# Patient Record
Sex: MALE | Race: WHITE | NOT HISPANIC OR LATINO | Employment: FULL TIME | URBAN - METROPOLITAN AREA
[De-identification: names, ages, dates, MRNs, and addresses within clinical notes are randomized per-mention and may not be internally consistent; named-entity substitution may affect disease eponyms.]

---

## 2017-07-28 ENCOUNTER — TELEPHONE (OUTPATIENT)
Dept: FAMILY MEDICINE | Facility: CLINIC | Age: 62
End: 2017-07-28

## 2017-07-28 DIAGNOSIS — Z00.00 PREVENTATIVE HEALTH CARE: ICD-10-CM

## 2017-07-28 DIAGNOSIS — Z00.00 ROUTINE GENERAL MEDICAL EXAMINATION AT A HEALTH CARE FACILITY: Primary | ICD-10-CM

## 2017-07-28 NOTE — TELEPHONE ENCOUNTER
----- Message from Helen Tidwell sent at 7/28/2017  1:34 PM CDT -----  Contact: [phone]/self  Patient is requesting orders for blood work. Please advise.

## 2017-09-06 ENCOUNTER — TELEPHONE (OUTPATIENT)
Dept: FAMILY MEDICINE | Facility: CLINIC | Age: 62
End: 2017-09-06

## 2017-09-06 NOTE — TELEPHONE ENCOUNTER
----- Message from Sophie Thompson sent at 9/6/2017  1:28 PM CDT -----  Contact: [phone]  Pt its requesting to schedule lab work prior to his physical for next week . Please advise

## 2017-09-12 ENCOUNTER — LAB VISIT (OUTPATIENT)
Dept: LAB | Facility: HOSPITAL | Age: 62
End: 2017-09-12
Attending: FAMILY MEDICINE
Payer: COMMERCIAL

## 2017-09-12 DIAGNOSIS — Z00.00 PREVENTATIVE HEALTH CARE: ICD-10-CM

## 2017-09-12 DIAGNOSIS — Z00.00 ROUTINE GENERAL MEDICAL EXAMINATION AT A HEALTH CARE FACILITY: ICD-10-CM

## 2017-09-12 LAB
ALBUMIN SERPL BCP-MCNC: 3.9 G/DL
ALP SERPL-CCNC: 73 U/L
ALT SERPL W/O P-5'-P-CCNC: 17 U/L
ANION GAP SERPL CALC-SCNC: 7 MMOL/L
AST SERPL-CCNC: 18 U/L
BASOPHILS # BLD AUTO: 0.02 K/UL
BASOPHILS NFR BLD: 0.4 %
BILIRUB SERPL-MCNC: 1.3 MG/DL
BUN SERPL-MCNC: 17 MG/DL
CALCIUM SERPL-MCNC: 9.4 MG/DL
CHLORIDE SERPL-SCNC: 106 MMOL/L
CHOLEST SERPL-MCNC: 136 MG/DL
CHOLEST/HDLC SERPL: 3.3 {RATIO}
CO2 SERPL-SCNC: 29 MMOL/L
COMPLEXED PSA SERPL-MCNC: 0.82 NG/ML
CREAT SERPL-MCNC: 1.1 MG/DL
DIFFERENTIAL METHOD: NORMAL
EOSINOPHIL # BLD AUTO: 0.2 K/UL
EOSINOPHIL NFR BLD: 3.4 %
ERYTHROCYTE [DISTWIDTH] IN BLOOD BY AUTOMATED COUNT: 12.4 %
EST. GFR  (AFRICAN AMERICAN): >60 ML/MIN/1.73 M^2
EST. GFR  (NON AFRICAN AMERICAN): >60 ML/MIN/1.73 M^2
ESTIMATED AVG GLUCOSE: 105 MG/DL
GLUCOSE SERPL-MCNC: 109 MG/DL
HBA1C MFR BLD HPLC: 5.3 %
HCT VFR BLD AUTO: 47.8 %
HDLC SERPL-MCNC: 41 MG/DL
HDLC SERPL: 30.1 %
HGB BLD-MCNC: 15.9 G/DL
LDLC SERPL CALC-MCNC: 84.6 MG/DL
LYMPHOCYTES # BLD AUTO: 1 K/UL
LYMPHOCYTES NFR BLD: 21.6 %
MCH RBC QN AUTO: 30.2 PG
MCHC RBC AUTO-ENTMCNC: 33.3 G/DL
MCV RBC AUTO: 91 FL
MONOCYTES # BLD AUTO: 0.5 K/UL
MONOCYTES NFR BLD: 11.3 %
NEUTROPHILS # BLD AUTO: 2.9 K/UL
NEUTROPHILS NFR BLD: 62.9 %
NONHDLC SERPL-MCNC: 95 MG/DL
PLATELET # BLD AUTO: 157 K/UL
PMV BLD AUTO: 10 FL
POTASSIUM SERPL-SCNC: 3.9 MMOL/L
PROT SERPL-MCNC: 6.9 G/DL
RBC # BLD AUTO: 5.27 M/UL
SODIUM SERPL-SCNC: 142 MMOL/L
TRIGL SERPL-MCNC: 52 MG/DL
TSH SERPL DL<=0.005 MIU/L-ACNC: 1 UIU/ML
WBC # BLD AUTO: 4.68 K/UL

## 2017-09-12 PROCEDURE — 80053 COMPREHEN METABOLIC PANEL: CPT

## 2017-09-12 PROCEDURE — 83036 HEMOGLOBIN GLYCOSYLATED A1C: CPT

## 2017-09-12 PROCEDURE — 36415 COLL VENOUS BLD VENIPUNCTURE: CPT

## 2017-09-12 PROCEDURE — 84443 ASSAY THYROID STIM HORMONE: CPT

## 2017-09-12 PROCEDURE — 85025 COMPLETE CBC W/AUTO DIFF WBC: CPT

## 2017-09-12 PROCEDURE — 84153 ASSAY OF PSA TOTAL: CPT

## 2017-09-12 PROCEDURE — 80061 LIPID PANEL: CPT

## 2017-09-26 ENCOUNTER — OFFICE VISIT (OUTPATIENT)
Dept: FAMILY MEDICINE | Facility: CLINIC | Age: 62
End: 2017-09-26
Payer: COMMERCIAL

## 2017-09-26 VITALS
HEART RATE: 65 BPM | SYSTOLIC BLOOD PRESSURE: 118 MMHG | BODY MASS INDEX: 27.41 KG/M2 | DIASTOLIC BLOOD PRESSURE: 72 MMHG | HEIGHT: 73 IN | OXYGEN SATURATION: 96 % | WEIGHT: 206.81 LBS

## 2017-09-26 DIAGNOSIS — M75.21 BICEPS TENDINITIS, RIGHT: ICD-10-CM

## 2017-09-26 DIAGNOSIS — Z00.00 ROUTINE GENERAL MEDICAL EXAMINATION AT A HEALTH CARE FACILITY: Primary | ICD-10-CM

## 2017-09-26 PROCEDURE — 99396 PREV VISIT EST AGE 40-64: CPT | Mod: S$GLB,,, | Performed by: FAMILY MEDICINE

## 2017-09-26 PROCEDURE — 99999 PR PBB SHADOW E&M-EST. PATIENT-LVL III: CPT | Mod: PBBFAC,,, | Performed by: FAMILY MEDICINE

## 2017-09-26 NOTE — PROGRESS NOTES
(Portions of this note were dictated using voice recognition software and may contain dictation related errors in spelling/grammar/syntax not found on text review)    CC:   Chief Complaint   Patient presents with    Annual Exam       HPI: 61 y.o. male last visit 11 2016    In the past were watching blood sugars given readings in the 110 range although with normal A1c.  Recent labs demonstrates sugar 109 fasting, A1c of 5.3.  Normal renal hepatic profile.  Remainder of labs as shown below    Overall doing well.  Not doing much physical exercise but is more active than he used to be.  Does find some pain in the right antecubital fossa, somewhat getting better but just wanted to get evaluated.  Happen when he was doing a lot of work around the house.  No direct trauma.    Past Medical History:   Diagnosis Date    DDD (degenerative disc disease), lumbar        No past surgical history on file.    Family History   Problem Relation Age of Onset    Diabetes Father     Coronary artery disease Paternal Grandfather 74     CABG    Heart disease Mother      mid 60s unknown if <65    Heart disease Paternal Uncle      late 60s    Prostate cancer Neg Hx     Colon cancer Neg Hx        Social History     Social History    Marital status:      Spouse name: N/A    Number of children: N/A    Years of education: N/A     Occupational History    Not on file.     Social History Main Topics    Smoking status: Never Smoker    Smokeless tobacco: Not on file    Alcohol use Yes      Comment: social    Drug use: Unknown    Sexual activity: Not on file     Other Topics Concern    Not on file     Social History Narrative    No narrative on file         HEALTH SCREENINGS  Immunizations:  tdap 2014  Flu  declines     Age/Gender Appropriate screenings:  Colonoscopy 2008,  Normal.  No history of polyps.  No family history of colon cancer.  Prostate: 2016    Lab Results   Component Value Date    WBC 4.68 09/12/2017    HGB 15.9  09/12/2017    HCT 47.8 09/12/2017     09/12/2017    CHOL 136 09/12/2017    TRIG 52 09/12/2017    HDL 41 09/12/2017    ALT 17 09/12/2017    AST 18 09/12/2017     09/12/2017    K 3.9 09/12/2017     09/12/2017    CREATININE 1.1 09/12/2017    BUN 17 09/12/2017    CO2 29 09/12/2017    TSH 1.003 09/12/2017    PSA 0.82 09/12/2017    HGBA1C 5.3 09/12/2017    LDLCALC 84.6 09/12/2017     09/12/2017           Vital signs reviewed  PE:   APPEARANCE: Well nourished, well developed, in no acute distress.    HEAD: Normocephalic, atraumatic.  EYES: PERRL. EOMI.   Conjunctivae noninjected.  EARS: TM's intact. Light reflex normal. No retraction or perforation.    NOSE: Mucosa pink. Airway clear.  MOUTH & THROAT: No tonsillar enlargement. No pharyngeal erythema or exudate.   NECK: Supple with no cervical lymphadenopathy.    CHEST: Good inspiratory effort. Lungs clear to auscultation with no wheezes or crackles.  CARDIOVASCULAR: Normal S1, S2. No rubs, murmurs, or gallops.  ABDOMEN: Bowel sounds normal. Not distended. Soft. No tenderness or masses. No organomegaly.  EXTREMITIES: No edema, cyanosis, or clubbing.  Mild TTP at distal biceps tendon at the antecubital fossa during active elbow flexion  PROSTATE: Smooth, symmetric, non-enlarged, nontender      IMPRESSION  1. Routine general medical examination at a health care facility    2. Biceps tendinitis, right        PLAN  Reviewed labs as above.  Normal A1c.  Sugar slightly high fasting, continue to monitor and work on exercise, diet, weight control.  Lipids normal.    Screenings: Tdap up-to-date.  Prostate screening today.  PSA normal.  Colonoscopy up-to-date until next year    Mild biceps distal tendinitis.  Advise conservative measures such as rest, heat application, NSAIDs temporarily, followed by graduated increase in weight resistance.  If concern for continued or worsening problems despite conservative management, can consult physical therapy.

## 2017-12-27 ENCOUNTER — OFFICE VISIT (OUTPATIENT)
Dept: FAMILY MEDICINE | Facility: CLINIC | Age: 62
End: 2017-12-27
Payer: COMMERCIAL

## 2017-12-27 VITALS
WEIGHT: 214.94 LBS | OXYGEN SATURATION: 95 % | HEART RATE: 94 BPM | HEIGHT: 73 IN | SYSTOLIC BLOOD PRESSURE: 118 MMHG | DIASTOLIC BLOOD PRESSURE: 72 MMHG | RESPIRATION RATE: 18 BRPM | BODY MASS INDEX: 28.49 KG/M2 | TEMPERATURE: 99 F

## 2017-12-27 DIAGNOSIS — J06.9 URI WITH COUGH AND CONGESTION: Primary | ICD-10-CM

## 2017-12-27 PROCEDURE — 99214 OFFICE O/P EST MOD 30 MIN: CPT | Mod: S$GLB,,, | Performed by: FAMILY MEDICINE

## 2017-12-27 PROCEDURE — 99999 PR PBB SHADOW E&M-EST. PATIENT-LVL IV: CPT | Mod: PBBFAC,,, | Performed by: FAMILY MEDICINE

## 2017-12-27 RX ORDER — AZITHROMYCIN 250 MG/1
TABLET, FILM COATED ORAL
Refills: 0 | COMMUNITY
Start: 2017-12-24 | End: 2018-09-14

## 2017-12-27 RX ORDER — CODEINE PHOSPHATE AND GUAIFENESIN 10; 100 MG/5ML; MG/5ML
5 SOLUTION ORAL NIGHTLY PRN
Qty: 180 ML | Refills: 0 | Status: SHIPPED | OUTPATIENT
Start: 2017-12-27 | End: 2018-01-06

## 2017-12-27 RX ORDER — BENZONATATE 200 MG/1
200 CAPSULE ORAL 3 TIMES DAILY PRN
Qty: 30 CAPSULE | Refills: 0 | Status: SHIPPED | OUTPATIENT
Start: 2017-12-27 | End: 2018-01-06

## 2017-12-27 RX ORDER — GUAIFENESIN 600 MG/1
1200 TABLET, EXTENDED RELEASE ORAL 2 TIMES DAILY
COMMUNITY
End: 2018-09-14

## 2017-12-27 NOTE — PROGRESS NOTES
Subjective:       Patient ID: Rey Stanley is a 61 y.o. male.    Chief Complaint: Cough and Sinus Problem    HPI  61 year old male here for persistent cough and sinus congestion for 5 days.  Patient went to urgent care 2 days ago and was given a steroid shot, mucinex and a zpak. Patient state his symptoms have worsened.   Review of Systems   Constitutional: Negative for chills and fever.   HENT: Positive for congestion, postnasal drip, sinus pressure and sore throat.    Respiratory: Positive for cough and shortness of breath. Negative for wheezing.    Cardiovascular: Negative for chest pain and leg swelling.   Gastrointestinal: Negative for abdominal pain, diarrhea, nausea and vomiting.       Objective:      Vitals:    12/27/17 1125   BP: 118/72   Pulse: 94   Resp: 18   Temp: 99.4 °F (37.4 °C)     Physical Exam   Constitutional: He is oriented to person, place, and time. He appears well-developed and well-nourished. No distress.   HENT:   Mouth/Throat: Oropharynx is clear and moist. No oropharyngeal exudate.   Eyes: EOM are normal. Right eye exhibits no discharge. Left eye exhibits no discharge.   Neck: Normal range of motion.   Cardiovascular: Normal rate, regular rhythm and normal heart sounds.    No murmur heard.  Pulmonary/Chest: Effort normal and breath sounds normal. No respiratory distress. He has no wheezes.   Lymphadenopathy:     He has no cervical adenopathy.   Neurological: He is alert and oriented to person, place, and time.   Skin: He is not diaphoretic.   Psychiatric: He has a normal mood and affect. His behavior is normal. Judgment and thought content normal.   Vitals reviewed.      Assessment:       1. URI with cough and congestion        Plan:         1. URI: continue claritin, add nasocort. tessalon prn. cheratussin at night prn. Patient advised on increasing water intake, bland foods and rest. He was given a letter excusing him from work.  He was advised to seek medical attention if symptoms  worsen.     URI with cough and congestion    Other orders  -     benzonatate (TESSALON) 200 MG capsule; Take 1 capsule (200 mg total) by mouth 3 (three) times daily as needed for Cough.  Dispense: 30 capsule; Refill: 0  -     guaifenesin-codeine 100-10 mg/5 ml (TUSSI-ORGANIDIN NR)  mg/5 mL syrup; Take 5 mLs by mouth nightly as needed.  Dispense: 180 mL; Refill: 0      Return if symptoms worsen or fail to improve.

## 2017-12-27 NOTE — PATIENT INSTRUCTIONS
Viral Upper Respiratory Illness (Adult)  You have a viral upper respiratory illness (URI), which is another term for the common cold. This illness is contagious during the first few days. It is spread through the air by coughing and sneezing. It may also be spread by direct contact (touching the sick person and then touching your own eyes, nose, or mouth). Frequent handwashing will decrease risk of spread. Most viral illnesses go away within 7 to 10 days with rest and simple home remedies. Sometimes the illness may last for several weeks. Antibiotics will not kill a virus, and they are generally not prescribed for this condition.    Home care  · If symptoms are severe, rest at home for the first 2 to 3 days. When you resume activity, don't let yourself get too tired.  · Avoid being exposed to cigarette smoke (yours or others).  · You may use acetaminophen or ibuprofen to control pain and fever, unless another medicine was prescribed. (Note: If you have chronic liver or kidney disease, have ever had a stomach ulcer or gastrointestinal bleeding, or are taking blood-thinning medicines, talk with your healthcare provider before using these medicines.) Aspirin should never be given to anyone under 18 years of age who is ill with a viral infection or fever. It may cause severe liver or brain damage.  · Your appetite may be poor, so a light diet is fine. Avoid dehydration by drinking 6 to 8 glasses of fluids per day (water, soft drinks, juices, tea, or soup). Extra fluids will help loosen secretions in the nose and lungs.  · Over-the-counter cold medicines will not shorten the length of time youre sick, but they may be helpful for the following symptoms: cough, sore throat, and nasal and sinus congestion. (Note: Do not use decongestants if you have high blood pressure.)  Follow-up care  Follow up with your healthcare provider, or as advised.  When to seek medical advice  Call your healthcare provider right away if any  of these occur:  · Cough with lots of colored sputum (mucus)  · Severe headache; face, neck, or ear pain  · Difficulty swallowing due to throat pain  · Fever of 100.4°F (38°C)  Call 911, or get immediate medical care  Call emergency services right away if any of these occur:  · Chest pain, shortness of breath, wheezing, or difficulty breathing  · Coughing up blood  · Inability to swallow due to throat pain  Date Last Reviewed: 9/13/2015  © 3530-6734 [employer]. [address] [place] [address]. All rights reserved. This information is not intended as a substitute for professional medical care. Always follow your healthcare professional's instructions.

## 2017-12-28 ENCOUNTER — TELEPHONE (OUTPATIENT)
Dept: FAMILY MEDICINE | Facility: CLINIC | Age: 62
End: 2017-12-28

## 2017-12-28 NOTE — TELEPHONE ENCOUNTER
----- Message from Melissa Armendariz sent at 12/28/2017  1:03 PM CST -----  Contact: Self. [phone]  Patient would like to speak with you about getting an excuse for work extended till tomorrow because he still has a fever. Please advise

## 2018-07-11 ENCOUNTER — TELEPHONE (OUTPATIENT)
Dept: FAMILY MEDICINE | Facility: CLINIC | Age: 63
End: 2018-07-11

## 2018-07-11 DIAGNOSIS — Z00.00 ROUTINE GENERAL MEDICAL EXAMINATION AT A HEALTH CARE FACILITY: Primary | ICD-10-CM

## 2018-07-11 DIAGNOSIS — R73.09 ABNORMAL GLUCOSE: ICD-10-CM

## 2018-07-11 NOTE — TELEPHONE ENCOUNTER
----- Message from Nancy Vargas sent at 7/11/2018 11:08 AM CDT -----  Contact: [phone] self  Patient would like to be seen sooner than the next available appointment. Please advise.

## 2018-07-12 ENCOUNTER — TELEPHONE (OUTPATIENT)
Dept: FAMILY MEDICINE | Facility: CLINIC | Age: 63
End: 2018-07-12

## 2018-09-07 ENCOUNTER — LAB VISIT (OUTPATIENT)
Dept: LAB | Facility: HOSPITAL | Age: 63
End: 2018-09-07
Attending: FAMILY MEDICINE
Payer: COMMERCIAL

## 2018-09-07 DIAGNOSIS — Z00.00 ROUTINE GENERAL MEDICAL EXAMINATION AT A HEALTH CARE FACILITY: ICD-10-CM

## 2018-09-07 DIAGNOSIS — R73.09 ABNORMAL GLUCOSE: ICD-10-CM

## 2018-09-07 LAB
ALBUMIN SERPL BCP-MCNC: 4.1 G/DL
ALP SERPL-CCNC: 73 U/L
ALT SERPL W/O P-5'-P-CCNC: 22 U/L
ANION GAP SERPL CALC-SCNC: 8 MMOL/L
AST SERPL-CCNC: 21 U/L
BASOPHILS # BLD AUTO: 0.01 K/UL
BASOPHILS NFR BLD: 0.3 %
BILIRUB SERPL-MCNC: 1.5 MG/DL
BUN SERPL-MCNC: 19 MG/DL
CALCIUM SERPL-MCNC: 9.5 MG/DL
CHLORIDE SERPL-SCNC: 107 MMOL/L
CHOLEST SERPL-MCNC: 146 MG/DL
CHOLEST/HDLC SERPL: 3.6 {RATIO}
CO2 SERPL-SCNC: 27 MMOL/L
COMPLEXED PSA SERPL-MCNC: 0.7 NG/ML
CREAT SERPL-MCNC: 1.1 MG/DL
DIFFERENTIAL METHOD: ABNORMAL
EOSINOPHIL # BLD AUTO: 0.2 K/UL
EOSINOPHIL NFR BLD: 3.8 %
ERYTHROCYTE [DISTWIDTH] IN BLOOD BY AUTOMATED COUNT: 12.3 %
EST. GFR  (AFRICAN AMERICAN): >60 ML/MIN/1.73 M^2
EST. GFR  (NON AFRICAN AMERICAN): >60 ML/MIN/1.73 M^2
ESTIMATED AVG GLUCOSE: 100 MG/DL
GLUCOSE SERPL-MCNC: 107 MG/DL
HBA1C MFR BLD HPLC: 5.1 %
HCT VFR BLD AUTO: 47.1 %
HDLC SERPL-MCNC: 41 MG/DL
HDLC SERPL: 28.1 %
HGB BLD-MCNC: 15.5 G/DL
LDLC SERPL CALC-MCNC: 94.2 MG/DL
LYMPHOCYTES # BLD AUTO: 1.1 K/UL
LYMPHOCYTES NFR BLD: 29 %
MCH RBC QN AUTO: 29.9 PG
MCHC RBC AUTO-ENTMCNC: 32.9 G/DL
MCV RBC AUTO: 91 FL
MONOCYTES # BLD AUTO: 0.3 K/UL
MONOCYTES NFR BLD: 7.9 %
NEUTROPHILS # BLD AUTO: 2.3 K/UL
NEUTROPHILS NFR BLD: 58.7 %
NONHDLC SERPL-MCNC: 105 MG/DL
PLATELET # BLD AUTO: 141 K/UL
PMV BLD AUTO: 10 FL
POTASSIUM SERPL-SCNC: 4.3 MMOL/L
PROT SERPL-MCNC: 6.5 G/DL
RBC # BLD AUTO: 5.19 M/UL
SODIUM SERPL-SCNC: 142 MMOL/L
TRIGL SERPL-MCNC: 54 MG/DL
TSH SERPL DL<=0.005 MIU/L-ACNC: 0.73 UIU/ML
WBC # BLD AUTO: 3.9 K/UL

## 2018-09-07 PROCEDURE — 84153 ASSAY OF PSA TOTAL: CPT

## 2018-09-07 PROCEDURE — 85025 COMPLETE CBC W/AUTO DIFF WBC: CPT

## 2018-09-07 PROCEDURE — 36415 COLL VENOUS BLD VENIPUNCTURE: CPT

## 2018-09-07 PROCEDURE — 83036 HEMOGLOBIN GLYCOSYLATED A1C: CPT

## 2018-09-07 PROCEDURE — 80061 LIPID PANEL: CPT

## 2018-09-07 PROCEDURE — 84443 ASSAY THYROID STIM HORMONE: CPT

## 2018-09-07 PROCEDURE — 80053 COMPREHEN METABOLIC PANEL: CPT

## 2018-09-14 ENCOUNTER — OFFICE VISIT (OUTPATIENT)
Dept: FAMILY MEDICINE | Facility: CLINIC | Age: 63
End: 2018-09-14
Payer: COMMERCIAL

## 2018-09-14 VITALS
SYSTOLIC BLOOD PRESSURE: 121 MMHG | TEMPERATURE: 98 F | DIASTOLIC BLOOD PRESSURE: 79 MMHG | BODY MASS INDEX: 27.96 KG/M2 | OXYGEN SATURATION: 97 % | HEIGHT: 73 IN | WEIGHT: 211 LBS | HEART RATE: 66 BPM

## 2018-09-14 DIAGNOSIS — H61.23 BILATERAL IMPACTED CERUMEN: ICD-10-CM

## 2018-09-14 DIAGNOSIS — Z12.11 COLON CANCER SCREENING: ICD-10-CM

## 2018-09-14 DIAGNOSIS — R73.09 ABNORMAL GLUCOSE: ICD-10-CM

## 2018-09-14 DIAGNOSIS — Z00.00 ROUTINE GENERAL MEDICAL EXAMINATION AT A HEALTH CARE FACILITY: Primary | ICD-10-CM

## 2018-09-14 PROCEDURE — 99396 PREV VISIT EST AGE 40-64: CPT | Mod: S$GLB,,, | Performed by: FAMILY MEDICINE

## 2018-09-14 PROCEDURE — 99999 PR PBB SHADOW E&M-EST. PATIENT-LVL III: CPT | Mod: PBBFAC,,, | Performed by: FAMILY MEDICINE

## 2018-09-14 NOTE — PATIENT INSTRUCTIONS
Nutrition: How to Make Healthier Food Choices     Nutrition: How to Make Healthier Food Choices     Why is healthy eating important?  When combined with exercise, a healthy diet can help you lose weight, lower your cholesterol level and improve the way your body functions on a daily basis.  The U.S. Department of Agricultures (USDA) Food Guide Pyramid divides food into 6 basic food groups, consisting of 1) grains, 2) fruits, 3) vegetables, 4) meats and beans, 5) dairy and 6) fats.  The USDA recommends an adult daily diet include the following:  3 ounces of whole grains, and 6 ounces of grains total   2 cups of fruit   2 1/2 cups of vegetables   3 cups fat-free or low-fat dairy   The following are some ways to make healthier food choices and to get the recommended amounts of whole grains, fruits, vegetables and dairy.    Grains  Whole-grain breads are low in fat; they're also high in fiber and complex carbohydrates, which help you feel kwon longer and prevent overeating. Choose breads whose first ingredient says whole in front of the grain, for example, whole wheat flour or whole white flour; enriched or other types of flour have the important fiber and nutrients removed. Choose whole grain breads for sandwiches and as additions to meals.  Avoid rich bakery foods such as donuts, sweet rolls and muffins. These foods can contain more than 50% fat calories. Snacks such as lynda food cake and gingersnap cookies can satisfy your sweet tooth without adding fat to your diet.  Hot and cold cereals are usually low in fat. But instant cereals with cream may contain high-fat oils or butterfat. Granola cereals may also contain high-fat oils and extra sugars. Look for low-sugar options for both instant and granola cereals.  Avoid fried snacks such as potato chips and tortilla chips. Try the low-fat or baked versions instead.  Instead of this: Try this:   Croissants, biscuits, white breads and rolls Low-fat whole grain  "breads and rolls (wheat, rye and pumpernickel)   Doughnuts, pastries and scones English muffins and small whole grain bagels   Fried tortillas Soft tortillas (corn or whole wheat)   Sugar cereals and regular granola Oatmeal, low-fat granola and whole-grain cereal   Snack crackers Crackers (animal, davide, rye, soda, saltine, oyster)   Potato or corn chips and buttered popcorn Pretzels (unsalted) and popcorn (unbuttered)   White pasta Whole-wheat pasta   White rice Brown rice   Fried rice, or pasta and rice mixes that contain high-fat sauces Rice or pasta (without egg yolk) with vegetable sauces   All-purpose white flour 100% whole-wheat flour     Fruits and Vegetables  Fruits and vegetables are naturally low in fat. They add flavor and variety to your diet. They also contain fiber, vitamins and minerals.  Margarine, butter, mayonnaise and sour cream add fat to vegetables and fruits. Try using nonfat or low-fat versions of these foods. You can also use nonfat or low-fat yogurt or herbs as seasonings instead.  Instead of this: Try this:   Fried vegetables or vegetables served with cream, cheese or butter sauces All vegetables raw, steamed, broiled, baked or tossed with a very small amount of olive oil and salt and pepper   Coconut Fruit (fresh)   French fries, hash browns and potato   chips Baked white or sweet potatoes     Meat, Poultry and Fish  Beef, Pork, Veal and Lamb  Baking, broiling and roasting are the healthiest ways to prepare meat. Lean cuts can be pan-broiled or stir-fried. Use either a nonstick pan or nonstick spray coating instead of butter or margarine.  Trim outside fat before cooking. Trim any inside, separable fat before eating. Select low-fat, lean cuts of meat. Lean beef and veal cuts have the word "loin" or "round" in their names. Lean pork cuts have the word "loin" or "leg" in their names.  Use herbs, spices, fresh vegetables and nonfat marinades to season meat. Avoid high-fat sauces and " "gravies.  Poultry  Baking, broiling and roasting are the healthiest ways to prepare poultry. Skinless poultry can be pan-broiled or stir-fried. Use either a nonstick pan or nonstick spray coating instead of butter or margarine.  Remove skin and visible fat before cooking. Chicken breasts are a good choice because they are low in fat and high in protein. Use domestic goose and duck only once in a while because both are high in fat.  Fish  Poaching, steaming, baking and broiling are the healthiest ways to prepare fish. Fresh fish should have a clear color, a moist look, a clean smell and firm, springy flesh. If good-quality fresh fish isn't available, buy frozen fish.  Most seafood is high in healthy polyunsaturated fat. Omega-3 fatty acids are also found in some fatty fish, such as salmon and cold-water trout. They may help lower the risk of heart disease in some people.  Cross-over Foods  Dry beans, peas and lentils offer protein and fiber without the cholesterol and fat of meats. Once in a while, try substituting beans for meat in a favorite recipe, such as lasagna or chili.  TVP, or textured vegetable protein, is widely available in many foods. Vegetarian "hot dogs," "hamburger" and "chicken nuggets" are low-fat, cholesterol-free alternatives to meat.  Instead of this: Try this:   Regular or breaded fish sticks or cakes, fish canned in oil, seafood prepared with butter or served in high-fat sauce Fish (fresh, frozen, canned in water), low-fat fish sticks or cakes and shellfish (such as shrimp)   Prime and marbled cuts Select-grade lean beef (round, sirloin and loin)   Pork spare ribs and reddy Lean pork (tenderloin and loin chop) and turkey reddy   Regular ground beef Lean or extra-lean ground beef, ground chicken and turkey breast   Lunch meats such as pepperoni, salami, bologna and liverwurst Lean lunch meats such as turkey, chicken and ham   Regular hot dogs or sausage Fat-free hot dogs and turkey dogs "     Dairy  Choose skim milk or low-fat buttermilk. Substitute evaporated skim milk for cream in recipes for soups, sauces and coffee.  Try low-fat cheeses. Skim ricotta can replace cream cheese on a bagel or in a vegetable dip. Use part-skim cheeses in recipes. Use 1% cottage cheese for salads and cooking. String cheese is a low-fat, high-calcium snack option.  Plain nonfat yogurt can replace sour cream in many recipes. (To maintain texture, stir 1 tablespoon of cornstarch into each cup of yogurt that you use in cooking.) Try mixing frozen nonfat or low-fat yogurt with fruit for dessert.  Skim sherbet is an alternative to ice cream. Soft-serve and regular ice creams are also lower in fat than premium styles.  Instead of this: Try this:   Whole or 2% milk Non-fat or 1% milk   Evaporated milk Evaporated non-fat milk   Regular buttermilk Buttermilk made from non-fat (or 1%) milk   Yogurt made with whole milk Nonfat or low-fat yogurt   Regular cheese (examples: American, blue, Brie, cheddar, Daron and Parmesan) Low-fat cheese with less than 3 grams of fat per serving (example: natural cheese, processed cheese and nondairy cheese such as soy cheese)   Regular cottage cheese Low-fat, nonfat, and dry-curd cottage cheese with less than 2% fat   Regular cream cheese Low-fat cream cheese (no more than 3 grams of fat per ounce)   Regular ice cream Sorbet, sherbet and nonfat or low-fat ice cream (no more than 3 grams of fat per 1/2 cup serving)     Fats, Oils and Sweets  Eating too many high-fat foods not only adds excess calories (which can lead to obesity and weight gain), but can increase your risk factor for several diseases. Heart disease, diabetes, certain types of cancer and osteoarthritis have all been linked to diets too high in fat. If you consume too much saturated and trans fats, you are more likely to develop high cholesterol and coronary artery disease.  Sugar-sweetened drinks, such as fruit juice, fruit drinks,  regular soft drinks, sports drinks, energy drinks, sweetened or flavored milk and sweetened iced tea can add lots of sugar and calories to your diet. But staying hydrated is important for good health. Substitute water, zero-calorie flavored water, non-fat or reduced-fat milk, unsweetened tea or diet soda for sweetened drinks. Talk with your family doctor or a dietitian if you have questions about your diet or healthy eating for your family.  Instead of this: Try this:   Cookies Fig bars, gingersnaps and molasses cookies   Shortening, butter or margarine Olive, soybean and canola oils   Regular mayonnaise Nonfat or light mayonnaise   Regular salad dressing Nonfat or light salad dressing   Using fat (including butter) to grease pan Nonstick cooking spray

## 2018-09-14 NOTE — PROGRESS NOTES
(Portions of this note were dictated using voice recognition software and may contain dictation related errors in spelling/grammar/syntax not found on text review)    CC:   Chief Complaint   Patient presents with    Annual Exam       HPI: 62 y.o. male here for annual exam no chronic medications.  Mildly elevated glucose 107, A1c normal range at 5.1.  Tries to walk 20 min a few times a week.  Avoid overeating, no specific dietary restrictions    Ears feel clogged up, has been using some peroxide topically periodically.  No pain. No fevers or chills      Past Medical History:   Diagnosis Date    DDD (degenerative disc disease), lumbar     IFG (impaired fasting glucose)        No past surgical history on file.    Family History   Problem Relation Age of Onset    Diabetes Father     Coronary artery disease Paternal Grandfather 74        CABG    Heart disease Mother         mid 60s unknown if <65    Heart disease Paternal Uncle         late 60s    Prostate cancer Neg Hx     Colon cancer Neg Hx        Social History     Socioeconomic History    Marital status:      Spouse name: Not on file    Number of children: Not on file    Years of education: Not on file    Highest education level: Not on file   Social Needs    Financial resource strain: Not on file    Food insecurity - worry: Not on file    Food insecurity - inability: Not on file    Transportation needs - medical: Not on file    Transportation needs - non-medical: Not on file   Occupational History    Not on file   Tobacco Use    Smoking status: Never Smoker    Smokeless tobacco: Never Used   Substance and Sexual Activity    Alcohol use: Yes     Comment: social    Drug use: Not on file    Sexual activity: Not on file   Other Topics Concern    Not on file   Social History Narrative    Not on file         Lab Results   Component Value Date    WBC 3.90 09/07/2018    HGB 15.5 09/07/2018    HCT 47.1 09/07/2018     (L) 09/07/2018     CHOL 146 09/07/2018    TRIG 54 09/07/2018    HDL 41 09/07/2018    ALT 22 09/07/2018    AST 21 09/07/2018     09/07/2018    K 4.3 09/07/2018     09/07/2018    CREATININE 1.1 09/07/2018    CALCIUM 9.5 09/07/2018    BUN 19 09/07/2018    CO2 27 09/07/2018    TSH 0.727 09/07/2018    PSA 0.70 09/07/2018    HGBA1C 5.1 09/07/2018    LDLCALC 94.2 09/07/2018     09/07/2018       PSA, SCREEN   Date Value Ref Range Status   09/07/2018 0.70 0.00 - 4.00 ng/mL Final     Comment:     PSA Expected levels:  Hormonal Therapy: <0.05 ng/ml  Prostatectomy: <0.01 ng/ml  Radiation Therapy: <1.00 ng/ml     09/12/2017 0.82 0.00 - 4.00 ng/mL Final     Comment:     PSA Expected levels:  Hormonal Therapy: <0.05 ng/ml  Prostatectomy: <0.01 ng/ml  Radiation Therapy: <1.00 ng/ml     11/02/2016 0.82 0.00 - 4.00 ng/mL Final     Comment:     PSA Expected levels:  Hormonal Therapy: <0.05 ng/ml  Prostatectomy: <0.01 ng/ml  Radiation Therapy: <1.00 ng/ml     10/26/2015 0.80 0.00 - 4.00 ng/mL Final     Comment:     PSA Expected levels:  Hormonal Therapy: <0.05 ng/ml  Prostatectomy: <0.01 ng/ml  Radiation Therapy: <1.00 ng/ml     10/02/2014 0.75 0.00 - 4.00 ng/mL Final     Comment:     PSA Expected levels:  Hormonal Therapy: <0.05 ng/ml  Prostatectomy: <0.01 ng/ml  Radiation Therapy: <1.00 ng/ml                     Vital signs reviewed  PE:   APPEARANCE: Well nourished, well developed, in no acute distress.    HEAD: Normocephalic, atraumatic.  EYES: PERRL. EOMI.   Conjunctivae noninjected.  EARS:  Cerumen impaction bilaterally    NOSE: Mucosa pink. Airway clear.  MOUTH & THROAT: No tonsillar enlargement. No pharyngeal erythema or exudate.   NECK: Supple with no cervical lymphadenopathy.    CHEST: Good inspiratory effort. Lungs clear to auscultation with no wheezes or crackles.  CARDIOVASCULAR: Normal S1, S2. No rubs, murmurs, or gallops.  ABDOMEN: Bowel sounds normal. Not distended. Soft. No tenderness or masses. No  organomegaly.  EXTREMITIES: No edema, cyanosis, or clubbing.      IMPRESSION  1. Routine general medical examination at a health care facility    2. Abnormal glucose    3. Colon cancer screening    4. Bilateral impacted cerumen        PLAN  Labs overall reassuring.  Glucose mildly elevated.  Continue dietary modification and exercise modification to help stabilize blood sugars    Cerumen impaction:  Discussed irrigation.  Patient agrees.              HEALTH SCREENINGS  Immunizations:  tdap 2014  Flu  :  Has declined because he gets somewhat fatigued for the next few days after.  Discussed that he could take some ibuprofen for couple of days to see if this will help.  He will plan to get this at work  Zoster:   to get pharmacy    Age/Gender Appropriate screenings:  Colonoscopy 2008,  Normal.  No history of polyps.  No family history of colon cancer.  Fit kit ordered  Prostate: 2017

## 2018-09-27 ENCOUNTER — LAB VISIT (OUTPATIENT)
Dept: LAB | Facility: HOSPITAL | Age: 63
End: 2018-09-27
Attending: FAMILY MEDICINE
Payer: COMMERCIAL

## 2018-09-27 DIAGNOSIS — Z12.11 COLON CANCER SCREENING: ICD-10-CM

## 2018-09-27 LAB — HEMOCCULT STL QL IA: NEGATIVE

## 2018-09-27 PROCEDURE — 82274 ASSAY TEST FOR BLOOD FECAL: CPT

## 2018-10-15 ENCOUNTER — PATIENT MESSAGE (OUTPATIENT)
Dept: FAMILY MEDICINE | Facility: CLINIC | Age: 63
End: 2018-10-15

## 2019-04-11 ENCOUNTER — OFFICE VISIT (OUTPATIENT)
Dept: FAMILY MEDICINE | Facility: CLINIC | Age: 64
End: 2019-04-11
Payer: COMMERCIAL

## 2019-04-11 VITALS
DIASTOLIC BLOOD PRESSURE: 72 MMHG | BODY MASS INDEX: 27.73 KG/M2 | SYSTOLIC BLOOD PRESSURE: 116 MMHG | TEMPERATURE: 98 F | HEIGHT: 73 IN | OXYGEN SATURATION: 96 % | HEART RATE: 56 BPM | WEIGHT: 209.19 LBS

## 2019-04-11 DIAGNOSIS — J06.9 UPPER RESPIRATORY TRACT INFECTION, UNSPECIFIED TYPE: Primary | ICD-10-CM

## 2019-04-11 PROCEDURE — 99999 PR PBB SHADOW E&M-EST. PATIENT-LVL III: CPT | Mod: PBBFAC,,, | Performed by: FAMILY MEDICINE

## 2019-04-11 PROCEDURE — 99213 OFFICE O/P EST LOW 20 MIN: CPT | Mod: S$GLB,,, | Performed by: FAMILY MEDICINE

## 2019-04-11 PROCEDURE — 99213 PR OFFICE/OUTPT VISIT, EST, LEVL III, 20-29 MIN: ICD-10-PCS | Mod: S$GLB,,, | Performed by: FAMILY MEDICINE

## 2019-04-11 PROCEDURE — 99999 PR PBB SHADOW E&M-EST. PATIENT-LVL III: ICD-10-PCS | Mod: PBBFAC,,, | Performed by: FAMILY MEDICINE

## 2019-04-11 RX ORDER — GUAIFENESIN 600 MG/1
1200 TABLET, EXTENDED RELEASE ORAL 2 TIMES DAILY
COMMUNITY
End: 2019-08-16

## 2019-04-11 RX ORDER — AMOXICILLIN 125 MG/5ML
250 POWDER, FOR SUSPENSION ORAL
COMMUNITY
End: 2019-08-16

## 2019-04-11 NOTE — PROGRESS NOTES
(Portions of this note were dictated using voice recognition software and may contain dictation related errors in spelling/grammar/syntax not found on text review)    CC:   Chief Complaint   Patient presents with    Cough    Nasal Congestion       HPI: 63 y.o. male Annual exam in September 2018.  Here for urgent care complaint of upper respiratory symptoms.  Started about 5 days ago.  Started with scratchy throat and then progressed to congestion, nasal drip, cough.  Some chest pain in the midsternum just with coughing.  No shortness of breath.  No body aches.  Felt low-grade subjective fever.  Went to urgent care center about 2 days later, got a steroid shot, prescription for amoxicillin.  Was advised on Mucinex and Claritin OTC.  Does not feel any better.  Denies any severe headache.  Was getting some slight pain in the mid forehead but this relieved with Advil.  Does complain of some watery eyes.  No significant sneezing.    Past Medical History:   Diagnosis Date    DDD (degenerative disc disease), lumbar     IFG (impaired fasting glucose)        No past surgical history on file.    Family History   Problem Relation Age of Onset    Diabetes Father     Coronary artery disease Paternal Grandfather 74        CABG    Heart disease Mother         mid 60s unknown if <65    Heart disease Paternal Uncle         late 60s    Prostate cancer Neg Hx     Colon cancer Neg Hx        Social History     Socioeconomic History    Marital status:      Spouse name: Not on file    Number of children: Not on file    Years of education: Not on file    Highest education level: Not on file   Occupational History    Not on file   Social Needs    Financial resource strain: Not on file    Food insecurity:     Worry: Not on file     Inability: Not on file    Transportation needs:     Medical: Not on file     Non-medical: Not on file   Tobacco Use    Smoking status: Never Smoker    Smokeless tobacco: Never Used    Substance and Sexual Activity    Alcohol use: Yes     Comment: social    Drug use: Not on file    Sexual activity: Not on file   Lifestyle    Physical activity:     Days per week: Not on file     Minutes per session: Not on file    Stress: Not on file   Relationships    Social connections:     Talks on phone: Not on file     Gets together: Not on file     Attends Yazdanism service: Not on file     Active member of club or organization: Not on file     Attends meetings of clubs or organizations: Not on file     Relationship status: Not on file   Other Topics Concern    Not on file   Social History Narrative    Not on file       ROS:  GENERAL:  Fatigue  SKIN: No rashes, no itching.  HEAD:  Above.  EYES:  Above  EARS: No ear pain or changes in hearing.  NOSE:  Above.  MOUTH & THROAT: No hoarseness, change in voice, or sore throat.  NODES: Denies swollen glands.  CHEST:  Above cough, sometimes productive of yellowish brown sputum.  CARDIOVASCULAR: Denies chest pain, PND, orthopnea.  ABDOMEN: No nausea, vomiting, or changes in bowel function.  URINARY: No flank pain, dysuria or hematuria.  PERIPHERAL VASCULAR: No claudication or cyanosis.  MUSCULOSKELETAL: No joint stiffness or swelling. Denies back pain.  NEUROLOGIC: No weakness or numbness.    Vital signs reviewed  PE:   APPEARANCE: Well nourished, well developed, in no acute distress.    HEAD: Normocephalic, atraumatic.  No sinus tenderness  EYES: PERRL. EOMI.   Conjunctivae noninjected.  EARS: TM's intact. Light reflex normal. No retraction or perforation  NOSE: Mucosa pink. Airway clear.  MOUTH & THROAT: No tonsillar enlargement. No pharyngeal erythema or exudate.   NECK: Supple with no cervical lymphadenopathy.    CHEST: Good inspiratory effort. Lungs clear to auscultation with no wheezes or crackles.  CARDIOVASCULAR: Normal S1, S2. No rubs, murmurs, or gallops.  ABDOMEN: Bowel sounds normal. Not distended. Soft. No tenderness or masses. No  organomegaly.  EXTREMITIES: No edema, cyanosis, or clubbing.      IMPRESSION    1. Upper respiratory tract infection, unspecified type          PLAN  Discussed likely viral URI.  Can likely discard amoxicillin    Advise expectant management with saline nasal rinses, Tylenol or NSAIDS as needed for pain or fever, Mucinex DM as needed for cough, and pseudoephedrine +/-antihistamine as needed for congestion. Throat lozenges and warm salt water gargles as needed for sore throat.  Expect symptom resolution to be between 7-10 days from time of onset of symptoms.  If symptoms last beyond this timeframe or patient develops worsening headaches, purulent nasal discharge, and fever, call or return to the office for further recommendations.      I had let him know that I will not be in the office next Thursday or Friday but if he does notice significant worsening by that time in or initial improvement followed by significant worsening he can call in in case empiric management for developing acute bacterial sinusitis with amoxicillin clavulanate is indicated at that time.

## 2019-06-27 DIAGNOSIS — Z00.00 ROUTINE GENERAL MEDICAL EXAMINATION AT A HEALTH CARE FACILITY: Primary | ICD-10-CM

## 2019-08-02 ENCOUNTER — PATIENT OUTREACH (OUTPATIENT)
Dept: ADMINISTRATIVE | Facility: HOSPITAL | Age: 64
End: 2019-08-02

## 2019-08-16 ENCOUNTER — CLINICAL SUPPORT (OUTPATIENT)
Dept: INTERNAL MEDICINE | Facility: CLINIC | Age: 64
End: 2019-08-16

## 2019-08-16 ENCOUNTER — OFFICE VISIT (OUTPATIENT)
Dept: FAMILY MEDICINE | Facility: CLINIC | Age: 64
End: 2019-08-16

## 2019-08-16 VITALS
SYSTOLIC BLOOD PRESSURE: 118 MMHG | HEART RATE: 72 BPM | BODY MASS INDEX: 26.97 KG/M2 | HEIGHT: 73 IN | DIASTOLIC BLOOD PRESSURE: 72 MMHG | WEIGHT: 203.5 LBS

## 2019-08-16 DIAGNOSIS — Z00.00 ROUTINE GENERAL MEDICAL EXAMINATION AT A HEALTH CARE FACILITY: Primary | ICD-10-CM

## 2019-08-16 DIAGNOSIS — Z85.9 HX OF SQUAMOUS CELL CARCINOMA EXCISION: ICD-10-CM

## 2019-08-16 DIAGNOSIS — Z12.5 SCREENING PSA (PROSTATE SPECIFIC ANTIGEN): ICD-10-CM

## 2019-08-16 DIAGNOSIS — Z80.3 FAMILY HISTORY OF BREAST CANCER: ICD-10-CM

## 2019-08-16 DIAGNOSIS — N62 GYNECOMASTIA: ICD-10-CM

## 2019-08-16 DIAGNOSIS — Z85.828 HX OF BASAL CELL CARCINOMA EXCISION: ICD-10-CM

## 2019-08-16 DIAGNOSIS — Z98.890 HX OF BASAL CELL CARCINOMA EXCISION: ICD-10-CM

## 2019-08-16 DIAGNOSIS — Z98.890 HX OF SQUAMOUS CELL CARCINOMA EXCISION: ICD-10-CM

## 2019-08-16 PROBLEM — J06.9 URI WITH COUGH AND CONGESTION: Status: RESOLVED | Noted: 2017-12-27 | Resolved: 2019-08-16

## 2019-08-16 LAB
BILIRUB SERPL-MCNC: NEGATIVE MG/DL
BLOOD URINE, POC: NEGATIVE
COLOR, POC UA: YELLOW
GLUCOSE UR QL STRIP: NORMAL
KETONES UR QL STRIP: NEGATIVE
LEUKOCYTE ESTERASE URINE, POC: NEGATIVE
NITRITE, POC UA: NEGATIVE
PH, POC UA: 7
PROTEIN, POC: NEGATIVE
SPECIFIC GRAVITY, POC UA: 1
UROBILINOGEN, POC UA: NORMAL

## 2019-08-16 PROCEDURE — 99999 PR PBB SHADOW E&M-EST. PATIENT-LVL III: ICD-10-PCS | Mod: PBBFAC,,, | Performed by: INTERNAL MEDICINE

## 2019-08-16 PROCEDURE — 99999 PR PBB SHADOW E&M-EST. PATIENT-LVL III: CPT | Mod: PBBFAC,,, | Performed by: INTERNAL MEDICINE

## 2019-08-16 PROCEDURE — 99386 PR PREVENTIVE VISIT,NEW,40-64: ICD-10-PCS | Mod: S$GLB,,, | Performed by: INTERNAL MEDICINE

## 2019-08-16 PROCEDURE — 81002 URINALYSIS NONAUTO W/O SCOPE: CPT | Mod: S$GLB,,, | Performed by: INTERNAL MEDICINE

## 2019-08-16 PROCEDURE — 99386 PREV VISIT NEW AGE 40-64: CPT | Mod: S$GLB,,, | Performed by: INTERNAL MEDICINE

## 2019-08-16 PROCEDURE — 81002 POCT URINE DIPSTICK WITHOUT MICROSCOPE: ICD-10-PCS | Mod: S$GLB,,, | Performed by: INTERNAL MEDICINE

## 2019-08-16 NOTE — PROGRESS NOTES
"/72  P72  RR12    .5  HT 6'1"    LABS DRAWN YESTERDAY PER OUT PT LAB & FINAL RESULTS IN Epic FOR REVIEW.  URINE SPECIMEN OBTAINED.  "

## 2019-08-16 NOTE — PATIENT INSTRUCTIONS
You do not take any medications. I recommend starting over the counter vitamin D because your vitamin D level was borderline low. Keep a watch on your breasts because of your family history. Have any hard lumps investigated. Your blood counts and blood chemistries were normal. Your cholesterol was excellent! PSA was normal. Thyroid test was normal. Urine analysis was also normal. The chest x-ray was normal. You are having a treadmill test next.

## 2019-08-19 ENCOUNTER — TELEPHONE (OUTPATIENT)
Dept: INTERNAL MEDICINE | Facility: CLINIC | Age: 64
End: 2019-08-19
Payer: COMMERCIAL

## 2019-08-23 PROBLEM — Z98.890 HX OF BASAL CELL CARCINOMA EXCISION: Status: ACTIVE | Noted: 2019-08-23

## 2019-08-23 PROBLEM — Z80.3 FAMILY HISTORY OF BREAST CANCER: Status: ACTIVE | Noted: 2019-08-23

## 2019-08-23 PROBLEM — N62 GYNECOMASTIA: Status: ACTIVE | Noted: 2019-08-23

## 2019-08-23 PROBLEM — Z85.828 HX OF BASAL CELL CARCINOMA EXCISION: Status: ACTIVE | Noted: 2019-08-23

## 2019-08-23 PROBLEM — Z85.9 HX OF SQUAMOUS CELL CARCINOMA EXCISION: Status: ACTIVE | Noted: 2019-08-23

## 2019-08-23 NOTE — PROGRESS NOTES
[facility]LL[facility] VISIT INTERNAL MEDICINE    Patient Active Problem List   Diagnosis   (none) - all problems resolved or deleted       CC: No chief complaint on file.      HPI: Rey Stanley   is a 63 y.o. male   I have reviewed the PMH,  and  for this patient. He  has a past medical history of Basal cell carcinoma, DDD (degenerative disc disease), lumbar, IFG (impaired fasting glucose), and Squamous cell carcinoma of head and neck. This is a new patient to me.  He presents today for an executive health physical.  He does not take any medications.  He has a strong family history of breast cancer.  He has some development in his breasts and he is concerned because of his strong family history of breast cancer.  He is little bit overweight and does not exercise regularly.  He knows that he needs to work on his diet.  His blood pressure looks good at 118/72.  He does have history of abnormal glucose in the past.  He has had a few skin lesions excised which were cancerous.  He sees a dermatologist regularly for checkups.      The patient reports consistent seatbelt usage.  The patient lives independently at home.   The patient does feel safe at home.    USPSTF Screening recommendations (from the [place] Department of Health and Human Services website) for age and sex were reviewed.     Diet and Exercise:  The patient describes diet as balanced but patient would like to improve.    It does not include adequate servings of fruits and vegetables.  As it relates to exercise, the patient does not routinely exercise but understands that this is important and plans to start routine exercising soon.  The patient does not take Vitamins nor OTC supplements.  As it relates to the patient's HIV status, the patient has negative status and does not desire testing on today.      ROS: Review of Systems   Constitutional: Negative for appetite change, chills, fatigue, fever and unexpected weight change.   HENT: Negative for congestion, ear pain,  mouth sores, postnasal drip, rhinorrhea, sinus pressure, sinus pain and sore throat.    Eyes: Negative for photophobia, discharge, redness, itching and visual disturbance.   Respiratory: Negative for cough, chest tightness, shortness of breath and wheezing.    Cardiovascular: Negative for chest pain, palpitations and leg swelling.   Gastrointestinal: Negative for abdominal pain, blood in stool, constipation, diarrhea, nausea and vomiting.   Endocrine: Negative for cold intolerance and heat intolerance.   Genitourinary: Negative for difficulty urinating, discharge, dysuria, flank pain, frequency and urgency.   Musculoskeletal: Negative for arthralgias, back pain, joint swelling, myalgias and neck pain.   Skin: Negative for rash and wound.   Neurological: Negative for dizziness, tremors, syncope, weakness, light-headedness, numbness and headaches.   Hematological: Negative for adenopathy. Does not bruise/bleed easily.   Psychiatric/Behavioral: Negative for agitation, confusion and sleep disturbance. The patient is not nervous/anxious.        PMHX:   Past Medical History:   Diagnosis Date    Basal cell carcinoma     left ear    DDD (degenerative disc disease), lumbar     IFG (impaired fasting glucose)     Squamous cell carcinoma of head and neck        PSHX:   Past Surgical History:   Procedure Laterality Date    CHOLECYSTECTOMY      SKIN LESION EXCISION         FAMHX:   Family History   Problem Relation Age of Onset    Diabetes Father     Coronary artery disease Paternal Grandfather 74        CABG    Heart disease Mother         mid 60s unknown if <65    Diabetes Mother     Heart disease Paternal Uncle         late 60s    Cancer Sister         breast    Cancer Sister         breast    Cancer Sister         breast    Prostate cancer Neg Hx     Colon cancer Neg Hx        SOCHX:   Social History     Socioeconomic History    Marital status:      Spouse name: Rita    Number of children: 2     Years of education: Not on file    Highest education level: Not on file   Occupational History    Occupation: management   Social Needs    Financial resource strain: Not on file    Food insecurity:     Worry: Not on file     Inability: Not on file    Transportation needs:     Medical: Not on file     Non-medical: Not on file   Tobacco Use    Smoking status: Never Smoker    Smokeless tobacco: Never Used   Substance and Sexual Activity    Alcohol use: Yes     Comment: social    Drug use: Never    Sexual activity: Yes   Lifestyle    Physical activity:     Days per week: Not on file     Minutes per session: Not on file    Stress: Not on file   Relationships    Social connections:     Talks on phone: Not on file     Gets together: Not on file     Attends Jew service: Not on file     Active member of club or organization: Not on file     Attends meetings of clubs or organizations: Not on file     Relationship status: Not on file   Other Topics Concern    Not on file   Social History Narrative    They live in [place]. His kids are both . He has 2 grandkids, 9 mos and 3. No pets. He likes to play golf and hunt.        ALLERGIES:   Review of patient's allergies indicates:   Allergen Reactions    No known drug allergies        MEDS: No current outpatient medications on file.    A1C:  Recent Labs   Lab 11/02/16 0715 09/12/17 0721 09/07/18  0704 08/15/19  0702   Hemoglobin A1C 5.3 5.3 5.1 5.2     CBC:  Recent Labs   Lab 11/02/16 0715 09/12/17 0721 09/07/18  0704 08/15/19  0702   WBC 4.83 4.68 3.90 4.14   RBC 5.39 5.27 5.19 5.36   Hemoglobin 16.8 15.9 15.5 16.5   Hematocrit 49.5 47.8 47.1 48.8   Platelets 153 157 141 L 152   Mean Corpuscular Volume 92 91 91 91   Mean Corpuscular Hemoglobin 31.2 H 30.2 29.9 30.8   Mean Corpuscular Hemoglobin Conc 33.9 33.3 32.9 33.8     CMP:  Recent Labs   Lab 11/02/16 0715 09/12/17 0721 09/07/18  0704 08/15/19  0702   Glucose 115 H 109 107 113 H   Calcium 9.6  "9.4 9.5 9.6   Albumin 4.3 3.9 4.1 4.6   Total Protein 7.1 6.9 6.5 7.3   Sodium 141 142 142 141   Potassium 4.1 3.9 4.3 4.0   CO2 26 29 27 29   Chloride 106 106 107 104   BUN, Bld 16 17 19 17   Creatinine 1.1 1.1 1.1 1.01   Alkaline Phosphatase 74 73 73 72   ALT 18 17 22 22   AST 18 18 21 24   Total Bilirubin 1.5 H 1.3 H 1.5 H 1.3 H     LIPIDS:  Recent Labs   Lab 11/02/16  0715 09/12/17  0721 09/07/18  0704 08/15/19  0702   TSH 0.882 1.003 0.727 1.540   HDL 42 41 41 40   Cholesterol 159 136 146 156   Triglycerides 96 52 54 56   LDL Cholesterol 97.8 84.6 94.2 104.8   Hdl/Cholesterol Ratio 26.4 30.1 28.1 25.6   Non-HDL Cholesterol 117 95 105 116   Total Cholesterol/HDL Ratio 3.8 3.3 3.6 3.9     TSH:  Recent Labs   Lab 11/02/16  0715 09/12/17  0721 09/07/18  0704 08/15/19  0702   TSH 0.882 1.003 0.727 1.540       OBJECTIVE:     Vitals:    08/16/19 0923   BP: 118/72   BP Location: Right arm   Patient Position: Sitting   BP Method: Medium (Manual)   Pulse: 72   Weight: 92.3 kg (203 lb 8 oz)   Height: 6' 1" (1.854 m)     Body mass index is 26.85 kg/m².    Physical Exam   Constitutional: He is oriented to person, place, and time. He appears well-developed and well-nourished. He does not have a sickly appearance.   HENT:   Head: Normocephalic and atraumatic. Hair is normal.   Right Ear: External ear and ear canal normal. Tympanic membrane is not erythematous and not retracted. No middle ear effusion.   Left Ear: External ear and ear canal normal. Tympanic membrane is not erythematous and not retracted.  No middle ear effusion.   Nose: No mucosal edema or rhinorrhea. Right sinus exhibits no maxillary sinus tenderness and no frontal sinus tenderness. Left sinus exhibits no maxillary sinus tenderness and no frontal sinus tenderness.   Mouth/Throat: Mucous membranes are normal. No oropharyngeal exudate or posterior oropharyngeal erythema. No tonsillar exudate.   Eyes: Pupils are equal, round, and reactive to light. Conjunctivae, " EOM and lids are normal. Right eye exhibits no discharge. Left eye exhibits no discharge. No scleral icterus.   Neck: Normal range of motion. Neck supple. No JVD present. No tracheal tenderness and no muscular tenderness present. Carotid bruit is not present. No tracheal deviation present. No thyroid mass and no thyromegaly present.   Cardiovascular: Normal rate, regular rhythm, normal heart sounds and intact distal pulses. Exam reveals no gallop and no friction rub.   No murmur heard.  Pulmonary/Chest: No tachypnea. No respiratory distress. He has no wheezes. He has no rhonchi. He has no rales. He exhibits no tenderness.   He has gynecomastia with some cysts in right breast   Abdominal: Soft. Bowel sounds are normal. He exhibits no distension and no mass. There is no hepatosplenomegaly. There is no tenderness. There is no rebound and no guarding. No hernia.   Musculoskeletal: Normal range of motion. He exhibits no edema or tenderness.   Lymphadenopathy:        Head (right side): No submandibular adenopathy present.        Head (left side): No submandibular adenopathy present.     He has no cervical adenopathy.   Neurological: He is alert and oriented to person, place, and time. He has normal strength. He displays no tremor and normal reflexes. No cranial nerve deficit or sensory deficit.   Skin: Skin is warm and dry. No lesion and no rash noted. No cyanosis or erythema. Nails show no clubbing.   Psychiatric: He has a normal mood and affect. His behavior is normal. Judgment normal. His mood appears not anxious. He is not agitated and not aggressive. He does not exhibit a depressed mood.   Vitals reviewed.        Depression Patient Health Questionnaire 4/11/2019 9/14/2018   Over the last two weeks how often have you been bothered by little interest or pleasure in doing things 0 0   Over the last two weeks how often have you been bothered by feeling down, depressed or hopeless 0 0   PHQ-2 Total Score 0 0        PERTINENT RESULTS:   UA was normal.     ASSESSMENT:  Problem List Items Addressed This Visit     None      Visit Diagnoses     Routine general medical examination at a health care facility    -  Primary - generally healthy exam. Stress test scheduled for today.       Hx of basal cell carcinoma excision    - follow-up with dermatology      Hx of squamous cell carcinoma excision    - follow-up with dermatology      Family history of breast cancer    - his breast area feels normal.       Gynecomastia    - he has some breast development, but no suspicious findings at this time. I advised him to check breast area monthly.           PLAN:        Counseling/Discussion Summary:    Diet & Exercise discussed and written information provided  I have recommended 30 minutes per day, 5 days per week of moderate intensity aerobic exercise, including walking, jogging or swimming.  Patient is to begin gently and gradually increase her exercise tolerance. Diet recommendation: low fat, low carbohydrates and low sodium.      Follow-up with me in 1 year.     Merrick Palma MD , FACP  Internal Medicine

## 2019-08-26 LAB
BILIRUB SERPL-MCNC: NORMAL MG/DL
BLOOD URINE, POC: NORMAL
COLOR, POC UA: YELLOW
GLUCOSE UR QL STRIP: NORMAL
KETONES UR QL STRIP: NORMAL
LEUKOCYTE ESTERASE URINE, POC: NORMAL
NITRITE, POC UA: NORMAL
PH, POC UA: 7
PROTEIN, POC: NORMAL
SPECIFIC GRAVITY, POC UA: 1
UROBILINOGEN, POC UA: NORMAL

## 2020-03-03 ENCOUNTER — TELEPHONE (OUTPATIENT)
Dept: FAMILY MEDICINE | Facility: CLINIC | Age: 65
End: 2020-03-03

## 2020-03-03 NOTE — TELEPHONE ENCOUNTER
Patient advised that Dr Chen would not be in for the rest of the day today.  Offered to schedule with another physician today or first available with Dr Chen.  Patient scheduled for 3/5 at 8:00am with Dr Chen.

## 2020-03-03 NOTE — TELEPHONE ENCOUNTER
----- Message from Carlos Haq sent at 3/3/2020  8:35 AM CST -----  Contact: patient  Type:  Same Day Appointment Request    Caller is requesting a same day appointment.  Caller declined first available appointment listed below.    Name of Caller: Rey  When is the first available appointment? 03/06/2020  Symptoms: upper respiratory infection/went to urgent care last week  Best Call Back Number: [phone]  Additional Information:  Please call today per patient

## 2020-03-05 ENCOUNTER — OFFICE VISIT (OUTPATIENT)
Dept: FAMILY MEDICINE | Facility: CLINIC | Age: 65
End: 2020-03-05
Payer: COMMERCIAL

## 2020-03-05 VITALS
SYSTOLIC BLOOD PRESSURE: 122 MMHG | BODY MASS INDEX: 27.2 KG/M2 | OXYGEN SATURATION: 96 % | TEMPERATURE: 99 F | HEIGHT: 73 IN | WEIGHT: 205.25 LBS | DIASTOLIC BLOOD PRESSURE: 68 MMHG | HEART RATE: 83 BPM

## 2020-03-05 DIAGNOSIS — J06.9 VIRAL URI: Primary | ICD-10-CM

## 2020-03-05 PROCEDURE — 99214 PR OFFICE/OUTPT VISIT, EST, LEVL IV, 30-39 MIN: ICD-10-PCS | Mod: S$GLB,,, | Performed by: FAMILY MEDICINE

## 2020-03-05 PROCEDURE — 99999 PR PBB SHADOW E&M-EST. PATIENT-LVL III: ICD-10-PCS | Mod: PBBFAC,,, | Performed by: FAMILY MEDICINE

## 2020-03-05 PROCEDURE — 3008F PR BODY MASS INDEX (BMI) DOCUMENTED: ICD-10-PCS | Mod: CPTII,S$GLB,, | Performed by: FAMILY MEDICINE

## 2020-03-05 PROCEDURE — 99999 PR PBB SHADOW E&M-EST. PATIENT-LVL III: CPT | Mod: PBBFAC,,, | Performed by: FAMILY MEDICINE

## 2020-03-05 PROCEDURE — 3008F BODY MASS INDEX DOCD: CPT | Mod: CPTII,S$GLB,, | Performed by: FAMILY MEDICINE

## 2020-03-05 PROCEDURE — 99214 OFFICE O/P EST MOD 30 MIN: CPT | Mod: S$GLB,,, | Performed by: FAMILY MEDICINE

## 2020-03-05 RX ORDER — ALBUTEROL SULFATE 90 UG/1
2 AEROSOL, METERED RESPIRATORY (INHALATION) EVERY 6 HOURS PRN
Qty: 18 G | Refills: 1 | Status: SHIPPED | OUTPATIENT
Start: 2020-03-05 | End: 2022-03-30

## 2020-03-05 NOTE — PATIENT INSTRUCTIONS
Viral Upper respiratory tract infections    Most upper respiratory infections are in fact caused by viruses.  Unfortunately as there is no treatment to eradicate these viruses, treatment for upper respiratory infections relies on control of symptoms.      SORE THROAT:  You may take throat lozenges such as Halls cough drops for sore throat pain.  Also, gargling with warm salt water may help with pain symptoms also.  Chloraseptic Spray is an over-the-counter anesthetic spray that may provide relief for sore throat pain. Over-the-counter pain relievers such as Tylenol (acetaminophen), Motrin/Advil  (ibuprofen), or Aleve (naproxen) may also provide relief for sore throat pain. These medications will also help for body aches and/or fever.      NASAL CONGESTION  You may try an oral decongestant such as pseudoephedrine ( brand-name Sudafed).  Note that this medication is available only behind the pharmacy counter.  There are some over-the-counter decongestants with a medicine called phenylephrine but these may not be as effective.  There are some antihistamine/decongestant combination medications (for example, Claritin-D, Zyrtec-D, Allegra-D).  Since these medications already have pseudoephedrine in them, you may take these in place of plain Sudafed.  These may be effective additionally if there is early element of allergy symptoms contributing to your nasal congestion. Although there is a caution  about using decongestants in individuals with hypertension, if you are stable on blood pressure medications and her blood pressure is well-controlled, temporary use of these medications should be okay just as long as you continue to monitor your blood pressures.  If your blood pressure is not controlled on medication therapy, you may need to avoid these pseudoephedrine-containing medications and try antihistamines alone such as plain Claritin, Zyrtec, Allegra.     You may also use a device called a Neti Pot to help with nasal  congestion.  This is an effective treatment for helping clear out nasal congestion and drainage.  A Neti pot is a device in which a saltwater solution is flushed through the nasal passages to help clear out any congestion and help relieve swelling.  You may buy this at any pharmacy.   It is safe to take even if you  have uncontrolled hypertension or are taking certain medications and cannot take other therapies as listed above. However, it is important that she is sterile or distilled water to mix with the solution given to avoid any chance of contamination during the flushing process.    Cough  Cough is a common symptom with most upper respiratory tract infections. Many times this can occur late in the process of a cold and may be the last symptom to resolve.  This is usually a typical pattern for the upper respiratory virus, and usually not a sign that you have a chest infection.  You may have some coughing with mucus.  While no treatments are proven to be absolutely beneficial for cough, some available therapies include guaifenesin with dextromethorphan ( Mucinex DM, Robitussin-DM). This may help to settle the cough and to thin out any mucus associated with the cough.  It is important to stay well hydrated to avoid having the mucus become very thick and difficult to cough up. Many times the cough aspect of an upper respiratory infection may take up to 2 weeks to completely clear.       Most viral upper respiratory infections may take up to 7-10 days to clear.  Many people get better before this time, but some may take the full 7-10 days to completely get better.        Signs that may indicate something more than an upper respiratory viral infection may include:    - Persistent high fever above 100.4 ( note that early low-grade fever may even occur with a viral upper respiratory infection but usually gets better within a few days)    - While discolored mucus such as yellow or green drainage from the nose is not a  sign by itself of a bacterial infection, if you find that symptoms are lasting longer than 7-10 days and nasal drainage is becoming progressively more thick and discolored, this may be indicative of a bacterial sinus infection    - If overall your congestion and headache symptoms get worse beyond the 7-10 day time frame    - If your symptoms started to improve by the  7-10 day time frame but then suddenly got worse

## 2020-03-05 NOTE — PROGRESS NOTES
(Portions of this note were dictated using voice recognition software and may contain dictation related errors in spelling/grammar/syntax not found on text review)    CC:   Chief Complaint   Patient presents with    Sinusitis    Cough    Chest Congestion       HPI: 64 y.o. male Here for urgent care concern for  respiratory symptoms including cough, congestion.  Symptoms started about 8-9 days ago, started with body aches, fever feeling.  Went to urgent care the next day, got a steroid shot and was advised on Claritin D. Flu test was done which was negative.  Had been taking Claritin D until about 2-3 days ago and was not quite sure if it was helping.  He did feel a bit better 2 days after the urgent care visit, not sure if his because of the steroid shot as he felt very wired.  However, over the next couple of days started feeling fatigued again.  Headache and congestion is slightly improved.  However, he noticed cough becoming much more prominent.  Mostly dry, very rarely productive.  Has been feeling some chest tightness and easy fatigue and some slight shortness of breath when doing activities.  Had diarrhea for 3 days as well last few days.  No vomiting.  No chest pain but feels a slight upper pressure and tightness feeling.  Works at an [employer].  Multiple coworkers sick.  Denies any recent travel    Past Medical History:   Diagnosis Date    Basal cell carcinoma     left ear    DDD (degenerative disc disease), lumbar     IFG (impaired fasting glucose)     Squamous cell carcinoma of head and neck        Past Surgical History:   Procedure Laterality Date    CHOLECYSTECTOMY      SKIN LESION EXCISION         Family History   Problem Relation Age of Onset    Diabetes Father     Coronary artery disease Paternal Grandfather 74        CABG    Heart disease Mother         mid 60s unknown if <65    Diabetes Mother     Heart disease Paternal Uncle         late 60s    Cancer Sister         breast     Cancer Sister         breast    Cancer Sister         breast    Prostate cancer Neg Hx     Colon cancer Neg Hx        Social History     Tobacco Use    Smoking status: Never Smoker    Smokeless tobacco: Never Used   Substance Use Topics    Alcohol use: Yes     Comment: social    Drug use: Never       Lab Results   Component Value Date    WBC 4.14 08/15/2019    HGB 16.5 08/15/2019    HCT 48.8 08/15/2019    MCV 91 08/15/2019     08/15/2019    CHOL 156 08/15/2019    TRIG 56 08/15/2019    HDL 40 08/15/2019    ALT 22 08/15/2019    AST 24 08/15/2019    BILITOT 1.3 (H) 08/15/2019    ALKPHOS 72 08/15/2019     08/15/2019    K 4.0 08/15/2019     08/15/2019    CREATININE 1.01 08/15/2019    ESTGFRAFRICA >60.0 08/15/2019    EGFRNONAA >60.0 08/15/2019    CALCIUM 9.6 08/15/2019    ALBUMIN 4.6 08/15/2019    BUN 17 08/15/2019    CO2 29 08/15/2019    TSH 1.540 08/15/2019    PSA 0.79 08/15/2019    HGBA1C 5.2 08/15/2019    LDLCALC 104.8 08/15/2019     (H) 08/15/2019                 ROS:  GENERAL:  Above.  SKIN: No rashes, no itching.  HEAD: No headaches.  EYES: No visual changes  EARS: No ear pain or changes in hearing.  NOSE:  Above.  Does get some watery nasal discharge when bending forward at times  MOUTH & THROAT: No hoarseness, change in voice, or sore throat.  NODES: Denies swollen glands.  CHEST:  Above  CARDIOVASCULAR:  Above  ABDOMEN:  Above  URINARY:  Did get some slight urinary hesitancy when taking Claritin D  PERIPHERAL VASCULAR: No claudication or cyanosis.  MUSCULOSKELETAL:  Above  NEUROLOGIC: No weakness or numbness.    Vital signs reviewed  PE:   APPEARANCE: Well nourished, well developed, in no acute distress.    HEAD: Normocephalic, atraumatic.  No sinus tenderness  EYES: PERRL. EOMI.   Conjunctivae noninjected.  EARS: TM's intact. Light reflex normal. No retraction or perforation.  Mild serous effusion bilaterally  NOSE:  Mild nasal mucosal edema bilaterally.  MOUTH & THROAT: No  tonsillar enlargement. No pharyngeal erythema or exudate.   NECK: Supple with no cervical lymphadenopathy.  Slightly enlarged posterior right cervical lymph node slightly tender to palpation.  CHEST: Good inspiratory effort. Lungs clear to auscultation with no wheezes or crackles.  CARDIOVASCULAR: Normal S1, S2. No rubs, murmurs, or gallops.  ABDOMEN: Bowel sounds normal. Not distended. Soft. No tenderness or masses. No organomegaly.  EXTREMITIES: No edema, cyanosis, or clubbing.      IMPRESSION  1. Viral URI            PLAN  Symptoms still keeping with viral URI, discussed depending on the flu test that was done it is still possible that this could represent influenza.  At this time other physical findings do not point to other more concerning pathology such as pneumonia or acute bacterial sinusitis at least for now.  Would advise continue supportive care, can try Mucinex DM.  Claritin D is okay unless he notices progressively worsening urinary symptoms in which case he can discontinue this.  Advised on Neti pot.  Advised on Advil or Aleve for pains or aches.  Will prescribe albuterol inhaler for some chest tightness symptoms in case of bronchospastic symptoms.  Can take 2 puffs 3 to 4 times a day for the next 1-2 days and then taper as needed.  Advised that over the next 4-5 days if there is no progressive improvement, may need presumptive treatment for acute bacterial sinusitis or potential evaluation for pneumonia especially if lower respiratory symptoms such as productive cough or shortness of breath or more prominent.  Notify certainly for any worsening symptoms before that time frame.

## 2020-07-13 ENCOUNTER — LAB VISIT (OUTPATIENT)
Dept: FAMILY MEDICINE | Facility: CLINIC | Age: 65
End: 2020-07-13
Payer: COMMERCIAL

## 2020-07-13 ENCOUNTER — OFFICE VISIT (OUTPATIENT)
Dept: FAMILY MEDICINE | Facility: CLINIC | Age: 65
End: 2020-07-13
Payer: COMMERCIAL

## 2020-07-13 ENCOUNTER — TELEPHONE (OUTPATIENT)
Dept: FAMILY MEDICINE | Facility: CLINIC | Age: 65
End: 2020-07-13

## 2020-07-13 VITALS
HEART RATE: 63 BPM | DIASTOLIC BLOOD PRESSURE: 78 MMHG | WEIGHT: 200.19 LBS | TEMPERATURE: 98 F | OXYGEN SATURATION: 98 % | SYSTOLIC BLOOD PRESSURE: 124 MMHG | BODY MASS INDEX: 26.53 KG/M2 | HEIGHT: 73 IN

## 2020-07-13 DIAGNOSIS — R05.9 COUGH: ICD-10-CM

## 2020-07-13 DIAGNOSIS — J06.9 VIRAL URI: Primary | ICD-10-CM

## 2020-07-13 DIAGNOSIS — Z00.00 ROUTINE GENERAL MEDICAL EXAMINATION AT A HEALTH CARE FACILITY: Primary | ICD-10-CM

## 2020-07-13 DIAGNOSIS — R73.09 ABNORMAL GLUCOSE: ICD-10-CM

## 2020-07-13 PROCEDURE — 99214 PR OFFICE/OUTPT VISIT, EST, LEVL IV, 30-39 MIN: ICD-10-PCS | Mod: S$GLB,,, | Performed by: FAMILY MEDICINE

## 2020-07-13 PROCEDURE — 3008F BODY MASS INDEX DOCD: CPT | Mod: CPTII,S$GLB,, | Performed by: FAMILY MEDICINE

## 2020-07-13 PROCEDURE — 99214 OFFICE O/P EST MOD 30 MIN: CPT | Mod: S$GLB,,, | Performed by: FAMILY MEDICINE

## 2020-07-13 PROCEDURE — 99999 PR PBB SHADOW E&M-EST. PATIENT-LVL IV: CPT | Mod: PBBFAC,,, | Performed by: FAMILY MEDICINE

## 2020-07-13 PROCEDURE — 3008F PR BODY MASS INDEX (BMI) DOCUMENTED: ICD-10-PCS | Mod: CPTII,S$GLB,, | Performed by: FAMILY MEDICINE

## 2020-07-13 PROCEDURE — U0003 INFECTIOUS AGENT DETECTION BY NUCLEIC ACID (DNA OR RNA); SEVERE ACUTE RESPIRATORY SYNDROME CORONAVIRUS 2 (SARS-COV-2) (CORONAVIRUS DISEASE [COVID-19]), AMPLIFIED PROBE TECHNIQUE, MAKING USE OF HIGH THROUGHPUT TECHNOLOGIES AS DESCRIBED BY CMS-2020-01-R: HCPCS

## 2020-07-13 PROCEDURE — 99999 PR PBB SHADOW E&M-EST. PATIENT-LVL IV: ICD-10-PCS | Mod: PBBFAC,,, | Performed by: FAMILY MEDICINE

## 2020-07-13 RX ORDER — FLUTICASONE PROPIONATE 50 MCG
2 SPRAY, SUSPENSION (ML) NASAL DAILY
Qty: 16 G | Refills: 1 | Status: SHIPPED | OUTPATIENT
Start: 2020-07-13 | End: 2022-03-30

## 2020-07-13 NOTE — PROGRESS NOTES
(Portions of this note were dictated using voice recognition software and may contain dictation related errors in spelling/grammar/syntax not found on text review)    CC:   Chief Complaint   Patient presents with    Cough       HPI: 64 y.o. male presents for sore throat..  Last seen March of 2020 for respiratory symptoms including cough and congestion, diagnosed with viral URI.      States that 2-3 days ago he started with some nasal drip symptoms, some occasional coughing, occasional nasal congestion, sore throat.  No fevers, chills, headache, myalgia, chest pain, shortness of breath.  Has not tried any meds for this.  Was in [place] at the beach a couple of weeks ago with family.  States that they tried to stay distant from people, but there was no uniform usage of masks    Past Medical History:   Diagnosis Date    Basal cell carcinoma     left ear    DDD (degenerative disc disease), lumbar     IFG (impaired fasting glucose)     Squamous cell carcinoma of head and neck        Past Surgical History:   Procedure Laterality Date    CHOLECYSTECTOMY      SKIN LESION EXCISION         Family History   Problem Relation Age of Onset    Diabetes Father     Coronary artery disease Paternal Grandfather 74        CABG    Heart disease Mother         mid 60s unknown if <65    Diabetes Mother     Heart disease Paternal Uncle         late 60s    Cancer Sister         breast    Cancer Sister         breast    Cancer Sister         breast    Prostate cancer Neg Hx     Colon cancer Neg Hx        Social History     Tobacco Use    Smoking status: Never Smoker    Smokeless tobacco: Never Used   Substance Use Topics    Alcohol use: Yes     Comment: social    Drug use: Never       Lab Results   Component Value Date    WBC 4.14 08/15/2019    HGB 16.5 08/15/2019    HCT 48.8 08/15/2019    MCV 91 08/15/2019     08/15/2019    CHOL 156 08/15/2019    TRIG 56 08/15/2019    HDL 40 08/15/2019    ALT 22 08/15/2019     AST 24 08/15/2019    BILITOT 1.3 (H) 08/15/2019    ALKPHOS 72 08/15/2019     08/15/2019    K 4.0 08/15/2019     08/15/2019    CREATININE 1.01 08/15/2019    ESTGFRAFRICA >60.0 08/15/2019    EGFRNONAA >60.0 08/15/2019    CALCIUM 9.6 08/15/2019    ALBUMIN 4.6 08/15/2019    BUN 17 08/15/2019    CO2 29 08/15/2019    TSH 1.540 08/15/2019    PSA 0.79 08/15/2019    HGBA1C 5.2 08/15/2019    LDLCALC 104.8 08/15/2019     (H) 08/15/2019                 ROS:  GENERAL: No fever, chills, fatigability or weight loss.  SKIN: No rashes, no itching.  HEAD: No headaches.  EYES: No visual changes  EARS: No ear pain or changes in hearing.  NOSE:  Above  MOUTH & THROAT:  Above  NODES: Denies swollen glands.  CHEST: Denies EDWARDS, cyanosis, wheezing, cough and sputum production.  CARDIOVASCULAR: Denies chest pain, PND, orthopnea.  ABDOMEN: No nausea, vomiting, or changes in bowel function.  URINARY: No flank pain, dysuria or hematuria.  PERIPHERAL VASCULAR: No claudication or cyanosis.  MUSCULOSKELETAL: No joint stiffness or swelling. Denies back pain.  NEUROLOGIC: No weakness or numbness.    Vital signs reviewed  PE:   APPEARANCE: Well nourished, well developed, in no acute distress.    HEAD: Normocephalic, atraumatic.  EYES: PERRL. EOMI.   Conjunctivae noninjected.  EARS: TM's intact. Light reflex normal. No retraction or perforation  NOSE: Mucosa pink. Airway clear.  Mild nasal turbinate swelling left greater than right  MOUTH & THROAT: No tonsillar enlargement. No pharyngeal erythema or exudate.   NECK: Supple with no cervical lymphadenopathy.    CHEST: Good inspiratory effort. Lungs clear to auscultation with no wheezes or crackles.  CARDIOVASCULAR: Normal S1, S2. No rubs, murmurs, or gallops.  ABDOMEN: Bowel sounds normal. Not distended. Soft. No tenderness or masses. No organomegaly.  EXTREMITIES: No edema       IMPRESSION  1. Viral URI    2. Cough            PLAN  Viral URI versus allergic rhinitis.  Given COVID  prevalence and recent exposure to the beach in [place] with other people that had not been wearing masks, and unclear if consistent social distancing ,would recommend CoVID testing    Instructions to patient:    1.  We will get you tested for CoVID infection.  In the meantime until we get the results I do recommend strict quarantine/isolation to avoid any exposure to others in case you have the infection    2.  Trial  Claritin pill daily and Flonase 2 sprays each nostril once daily for the next 1-2 weeks for postnasal drip symptoms and nasal congestion    3.  Trial of Mucinex DM to help with cough    Notify us if you are having any worsening symptoms over the next several days

## 2020-07-13 NOTE — PATIENT INSTRUCTIONS
1.  We will get you tested for CoVID infection.  In the meantime until we get the results I do recommend strict quarantine/isolation to avoid any exposure to others in case you have the infection    2.  Trial  Claritin pill daily and Flonase 2 sprays each nostril once daily for the next 1-2 weeks for postnasal drip symptoms and nasal congestion    3.  Trial of Mucinex DM to help with cough    Notify us if you are having any worsening symptoms over the next several days

## 2020-07-14 NOTE — TELEPHONE ENCOUNTER
Orders Placed This Encounter   Procedures    PSA, Screening    CBC auto differential    Comprehensive metabolic panel    Lipid Panel    TSH    Hemoglobin A1C     Before appt

## 2020-07-16 LAB — SARS-COV-2 RNA RESP QL NAA+PROBE: NOT DETECTED

## 2020-08-07 ENCOUNTER — TELEPHONE (OUTPATIENT)
Dept: FAMILY MEDICINE | Facility: CLINIC | Age: 65
End: 2020-08-07

## 2020-08-07 NOTE — TELEPHONE ENCOUNTER
----- Message from Ammy Neff sent at 8/7/2020  9:50 AM CDT -----  Regarding: Req for lab work  Contact: Rey Stanley  Patient requesting orders for blood work. Patient is scheduled to be seen . Please notify patient when orders are in.    Please call and advise.  [email or web address]          [phone]    Thank You  Ammy Neff

## 2020-08-07 NOTE — TELEPHONE ENCOUNTER
----- Message from Sammie Funes sent at 8/7/2020  4:37 PM CDT -----  Regarding: Returning call  Contact: Self/ [phone]  Patient called in returning your call. Please advise.

## 2020-09-21 ENCOUNTER — OFFICE VISIT (OUTPATIENT)
Dept: FAMILY MEDICINE | Facility: CLINIC | Age: 65
End: 2020-09-21
Payer: COMMERCIAL

## 2020-09-21 VITALS
DIASTOLIC BLOOD PRESSURE: 76 MMHG | WEIGHT: 199.94 LBS | TEMPERATURE: 98 F | SYSTOLIC BLOOD PRESSURE: 118 MMHG | HEIGHT: 73 IN | HEART RATE: 61 BPM | BODY MASS INDEX: 26.5 KG/M2 | OXYGEN SATURATION: 98 %

## 2020-09-21 DIAGNOSIS — R25.2 CALF CRAMP: ICD-10-CM

## 2020-09-21 DIAGNOSIS — R73.09 ABNORMAL GLUCOSE: ICD-10-CM

## 2020-09-21 DIAGNOSIS — D69.6 THROMBOCYTOPENIA: ICD-10-CM

## 2020-09-21 DIAGNOSIS — Z12.11 COLON CANCER SCREENING: ICD-10-CM

## 2020-09-21 DIAGNOSIS — Z00.00 ROUTINE GENERAL MEDICAL EXAMINATION AT A HEALTH CARE FACILITY: Primary | ICD-10-CM

## 2020-09-21 DIAGNOSIS — M54.50 BILATERAL LOW BACK PAIN WITHOUT SCIATICA, UNSPECIFIED CHRONICITY: ICD-10-CM

## 2020-09-21 PROCEDURE — 99396 PR PREVENTIVE VISIT,EST,40-64: ICD-10-PCS | Mod: 25,S$GLB,, | Performed by: FAMILY MEDICINE

## 2020-09-21 PROCEDURE — 3008F BODY MASS INDEX DOCD: CPT | Mod: CPTII,S$GLB,, | Performed by: FAMILY MEDICINE

## 2020-09-21 PROCEDURE — 90686 IIV4 VACC NO PRSV 0.5 ML IM: CPT | Mod: S$GLB,,, | Performed by: FAMILY MEDICINE

## 2020-09-21 PROCEDURE — 99999 PR PBB SHADOW E&M-EST. PATIENT-LVL III: CPT | Mod: PBBFAC,,, | Performed by: FAMILY MEDICINE

## 2020-09-21 PROCEDURE — 99396 PREV VISIT EST AGE 40-64: CPT | Mod: 25,S$GLB,, | Performed by: FAMILY MEDICINE

## 2020-09-21 PROCEDURE — 3008F PR BODY MASS INDEX (BMI) DOCUMENTED: ICD-10-PCS | Mod: CPTII,S$GLB,, | Performed by: FAMILY MEDICINE

## 2020-09-21 PROCEDURE — 99999 PR PBB SHADOW E&M-EST. PATIENT-LVL III: ICD-10-PCS | Mod: PBBFAC,,, | Performed by: FAMILY MEDICINE

## 2020-09-21 PROCEDURE — 90686 FLU VACCINE (QUAD) GREATER THAN OR EQUAL TO 3YO PRESERVATIVE FREE IM: ICD-10-PCS | Mod: S$GLB,,, | Performed by: FAMILY MEDICINE

## 2020-09-21 PROCEDURE — 90471 IMMUNIZATION ADMIN: CPT | Mod: S$GLB,,, | Performed by: FAMILY MEDICINE

## 2020-09-21 PROCEDURE — 90471 FLU VACCINE (QUAD) GREATER THAN OR EQUAL TO 3YO PRESERVATIVE FREE IM: ICD-10-PCS | Mod: S$GLB,,, | Performed by: FAMILY MEDICINE

## 2020-09-21 RX ORDER — CHOLECALCIFEROL (VITAMIN D3) 25 MCG
1000 TABLET ORAL DAILY
COMMUNITY

## 2020-09-21 NOTE — PATIENT INSTRUCTIONS
Look into getting the Shingles vaccine (SHINGRIX) at your local pharmacy (2 part series, done once at/after age 50)      Calf Strain Rehabilitation Exercises    You can begin gently stretching your calf muscle using the towel stretch right away. Make sure you only get a gentle pull and not a sharp pain while you are doing this stretch.     Towel stretch: Sit on a hard surface with one leg stretched out in front of you. Loop a towel around your toes and the ball of your foot and pull the towel toward your body keeping your knee straight. Hold this position for 15 to 30 seconds then relax. Repeat 3 times.     After you can do the towel stretch easily, you can start the standing calf stretch.   Standing calf stretch: Facing a wall, put your hands against the wall at about eye level. Keep one leg back with the heel on the floor, and the other leg forward. Turn your back foot slightly inward (as if you were pigeon-toed) as you slowly lean into the wall until you feel a stretch in the back of your calf. Hold for 15 to 30 seconds. Repeat 3 times and then switch the position of your legs and repeat the exercise 3 times. Do this exercise several times each day.     After a couple days of stretching, you can begin strengthening your calf and lower leg muscles using elastic tubing as described in the next exercise.   Resisted ankle plantar flexion: Sit with your leg outstretched and loop the middle section of the tubing around the ball of your foot. Hold the ends of the tubing in both hands. Gently press the ball of your foot down and point your toes, stretching the tubing. Return to the starting position. Do 3 sets of 10.     You may do the last 4 exercises when you can stand on your toes without pain.   Heel raise: Balance yourself while standing behind a chair or counter. Using the chair to help you, raise your body up onto your toes and hold for 5 seconds. Then slowly lower yourself down without holding onto the chair.  "Hold onto the chair or counter if you need to. When this exercise becomes less painful, try lowering on one leg only. Repeat 10 times. Do 3 sets of 10.   You can challenge yourself by standing only on your injured leg and lifting your heel off the ground.   Single leg balance: Stand without any support and attempt to balance on one leg. Begin with your eyes open and then try to perform the exercise with your eyes closed. Hold the single-leg position for 30 seconds. Repeat 3 times. When you have mastered this, try doing this exercise standing on a pillow.   Nose touch: Stand on one leg facing a wall. Stand 4 inches from the wall. Keep your body and leg straight. Slowly lean forward, trying to touch your nose to the wall. Make sure you do not bend forward at your waist. Do 3 sets or 10.   Wall jump: Face a wall and place a piece of masking tape about 2 feet above your head. Jump up with your arms above your head and try to touch the piece of tape. Make sure you do a "spring" type of motion and do not land hard onto your feet. Progress to taking off and landing on one foot. Do 3 sets of 10.   Another good exercise is hopping. You can start at one end of the room and try to hop as high as you can across the room on one foot. Jumping rope is also a good exercise.   Written by Yoly Allen MS, PT, and Coleen Muniz PT, [facility], [facility], for [employer].   Published by [employer].  © 2009 [employer] and/or its affiliates. All Rights Reserved.          LOW BACK PAIN EXERCISES    Exercises that stretch and strengthen the muscles of your abdomen and spine can help prevent back problems. Strong back and abdominal muscles help you keep good posture, with your spine in its correct position.  If your muscles are tight, take a warm shower or bath before doing the exercises. Exercise on a rug or mat. Wear loose clothing. Dont wear shoes. Stop doing any exercise that causes pain until you have talked with your healthcare " provider.  Ask your provider or physical therapist to help you develop an exercise program. Ask your provider how many times a week you need to do the exercises. Remember to start slowly.   Exercises  These exercises are intended only as suggestions. Be sure to check with your provider before starting the exercises.   Standing hamstring stretch: Put the heel of one leg on a stool about 15 inches high. Keep your leg straight. Lean forward, bending at the hips until you feel a mild stretch in the back of your thigh. Make sure you do not roll your shoulders or bend at the waist when doing this. You want to stretch your leg, not your lower back. Hold the stretch for 15 to 30 seconds. Repeat with each leg 3 times.   Cat and camel: Get down on your hands and knees. Let your stomach sag, allowing your back to curve downward. Hold this position for 5 seconds. Then arch your back and hold for 5 seconds. Do 2 sets of 15.   Quadruped arm and leg raise: Get down on your hands and knees. Pull in your belly button and tighten your abdominal muscles to stiffen your spine. While keeping your abdominals tight, raise one arm and the opposite leg away from you. Hold this position for 5 seconds. Lower your arm and leg slowly and change sides. Do this 10 times on each side.   Pelvic tilt: Lie on your back with your knees bent and your feet flat on the floor. Pull your belly button in towards your spine and push your lower back into the floor, flattening your back. Hold this position for 15 seconds, then relax. Repeat 5 to 10 times.   Partial curl: Lie on your back with your knees bent and your feet flat on the floor. Draw in your abdomen and tighten your stomach muscles. With your hands stretched out in front of you, curl your upper body forward until your shoulders clear the floor. Hold this position for 3 seconds. Don't hold your breath. It helps to breathe out as you lift your shoulders. Relax back to the floor. Repeat 10 times.  Build to 2 sets of 15. To challenge yourself, clasp your hands behind your head and keep your elbows out to your sides.   Gluteal stretch: Lie on your back with both knees bent. Rest your right ankle over the knee of your left leg. Grasp the thigh of the left leg and pull toward your chest. You will feel a stretch along the buttocks and possibly along the outside of your hip. Hold the stretch for 15 to 30 seconds. Then repeat the exercise with your left ankle over your right knee. Do the exercise 3 times with each leg.   Extension exercise   Lie face down on the floor for 5 minutes. If this hurts too much, lie face down with a pillow under your stomach. This should relieve your leg or back pain. When you can lie on your stomach for 5 minutes without a pillow, you can continue with Part B of this exercise.   After lying on your stomach for 5 minutes, prop yourself up on your elbows for another 5 minutes. If you can do this without having more leg or buttock pain, you can start doing part C of this exercise.   Lie on your stomach with your hands under your shoulders. Then press down on your hands and extend your elbows while keeping your hips flat on the floor. Hold for 1 second and lower yourself to the floor. Do 3 to 5 sets of 10 repetitions. Rest for 1 minute between sets. You should have no pain in your legs when you do this, but it is normal to feel some pain in your lower back.   Do this exercise several times a day.  Side plank: Lie on your side with your legs, hips, and shoulders in a straight line. Prop yourself up onto your forearm with your elbow directly under your shoulder. Lift your hips off the floor and balance on your forearm and the outside of your foot. Try to hold this position for 15 seconds and then slowly lower your hip to the ground. Switch sides and repeat. Work up to holding for 1 minute. This exercise can be made easier by starting with your knees and hips flexed toward your  chest.            Back Exercises: Leg Pull        To start, lie on your back with your knees bent and feet flat on the floor. Dont press your neck or lower back to the floor. Breathe deeply. You should feel comfortable and relaxed in this position.  · Pull one knee to your chest.  · Hold for 30-60 seconds. Return to starting position.  · Repeat 2 times.  · Switch legs.  · For a double leg pull, pull both legs to your chest at the same time. Repeat 2 times.  For your safety, check with your healthcare provider before starting an exercise program.   © 6013-5838 [employer]. [address] [place] [address], [place] [address]. All rights reserved. This information is not intended as a substitute for professional medical care. Always follow your healthcare professional's instructions.

## 2020-09-21 NOTE — PROGRESS NOTES
(Portions of this note were dictated using voice recognition software and may contain dictation related errors in spelling/grammar/syntax not found on text review)    CC:   Chief Complaint   Patient presents with    Annual Exam       HPI: 64 y.o. male here for annual exam no chronic medications.  Mildly elevated glucose 116, A1c normal range at 5.2.  Mildly depressed platelet 145, trend below.    Active at work but no other advocated exercise.  Tries to eat healthfully    Occasionally gets some low back pain when he wakes up in the morning that gets better today.  Also will get some left calf cramping in the morning occasionally that gets better through the day.  No exertional symptoms.  No neuropathic symptoms       Past Medical History:   Diagnosis Date    Basal cell carcinoma     left ear    DDD (degenerative disc disease), lumbar     IFG (impaired fasting glucose)     Squamous cell carcinoma of head and neck        Past Surgical History:   Procedure Laterality Date    CHOLECYSTECTOMY      SKIN LESION EXCISION         Family History   Problem Relation Age of Onset    Diabetes Father     Coronary artery disease Paternal Grandfather 74        CABG    Heart disease Mother         mid 60s unknown if <65    Diabetes Mother     Heart disease Paternal Uncle         late 60s    Cancer Sister         breast    Cancer Sister         breast    Cancer Sister         breast    Prostate cancer Neg Hx     Colon cancer Neg Hx        Social History     Socioeconomic History    Marital status:      Spouse name: Rita    Number of children: 2    Years of education: Not on file    Highest education level: Not on file   Occupational History    Occupation: management   Social Needs    Financial resource strain: Not on file    Food insecurity     Worry: Not on file     Inability: Not on file    Transportation needs     Medical: Not on file     Non-medical: Not on file   Tobacco Use    Smoking status:  Never Smoker    Smokeless tobacco: Never Used   Substance and Sexual Activity    Alcohol use: Yes     Comment: social    Drug use: Never    Sexual activity: Yes   Lifestyle    Physical activity     Days per week: Not on file     Minutes per session: Not on file    Stress: Not on file   Relationships    Social connections     Talks on phone: Not on file     Gets together: Not on file     Attends [employer] service: Not on file     Active member of club or organization: Not on file     Attends meetings of clubs or organizations: Not on file     Relationship status: Not on file   Other Topics Concern    Not on file   Social History Narrative    They live in [place]. His kids are both . He has 2 grandkids, 9 mos and 3. No pets. He likes to play golf and hunt.          Lab Results   Component Value Date    WBC 4.14 08/17/2020    HGB 16.2 08/17/2020    HCT 49.3 08/17/2020     (L) 08/17/2020    CHOL 135 08/17/2020    TRIG 70 08/17/2020    HDL 44 08/17/2020    ALT 20 08/17/2020    AST 25 08/17/2020     (H) 08/17/2020    K 4.2 08/17/2020     08/17/2020    CREATININE 0.95 08/17/2020    CALCIUM 9.5 08/17/2020    BUN 14 08/17/2020    CO2 28 08/17/2020    TSH 1.000 08/17/2020    PSA 0.74 08/17/2020    HGBA1C 5.2 08/17/2020    LDLCALC 77.0 08/17/2020     (H) 08/17/2020       PSA, SCREEN   Date Value Ref Range Status   08/17/2020 0.74 0.00 - 4.00 ng/mL Final     Comment:     PSA Expected levels:  Hormonal Therapy: <0.05 ng/ml  Prostatectomy: <0.01 ng/ml  Radiation Therapy: <1.00 ng/ml     08/15/2019 0.79 0.00 - 4.00 ng/mL Final     Comment:     PSA Expected levels:  Hormonal Therapy: <0.05 ng/ml  Prostatectomy: <0.01 ng/ml  Radiation Therapy: <1.00 ng/ml     09/07/2018 0.70 0.00 - 4.00 ng/mL Final     Comment:     PSA Expected levels:  Hormonal Therapy: <0.05 ng/ml  Prostatectomy: <0.01 ng/ml  Radiation Therapy: <1.00 ng/ml     09/12/2017 0.82 0.00 - 4.00 ng/mL Final     Comment:     PSA  Expected levels:  Hormonal Therapy: <0.05 ng/ml  Prostatectomy: <0.01 ng/ml  Radiation Therapy: <1.00 ng/ml     11/02/2016 0.82 0.00 - 4.00 ng/mL Final     Comment:     PSA Expected levels:  Hormonal Therapy: <0.05 ng/ml  Prostatectomy: <0.01 ng/ml  Radiation Therapy: <1.00 ng/ml           Platelets (K/uL)   Date Value   08/17/2020 145 (L)   08/15/2019 152   09/07/2018 141 (L)   09/12/2017 157   11/02/2016 153   10/26/2015 153   10/02/2014 151               Vital signs reviewed  PE:   APPEARANCE: Well nourished, well developed, in no acute distress.    HEAD: Normocephalic, atraumatic.  EYES:  Conjunctivae noninjected.  NECK: Supple with no cervical lymphadenopathy.  No carotid bruits.  No thyromegaly  CHEST: Good inspiratory effort. Lungs clear to auscultation with no wheezes or crackles.  CARDIOVASCULAR: Normal S1, S2. No rubs, murmurs, or gallops.  ABDOMEN: Bowel sounds normal. Not distended. Soft. No tenderness or masses. No organomegaly.  EXTREMITIES: No edema, cyanosis, or clubbing.  BACK/MSK :no significant tenderness midline or paralumbar region bilaterally.  Negative straight leg raise.  2+ patellar and ankle reflexes bilaterally.  No calf tenderness to palpation bilaterally.  No calf edema or asymmetry.    IMPRESSION  1. Routine general medical examination at a health care facility    2. Abnormal glucose    3. Calf cramp    4. Bilateral low back pain without sciatica, unspecified chronicity    5. Colon cancer screening    6. Thrombocytopenia        PLAN  Mildly elevated glucose, exercise and dietary modification advised    Calf cramping and back cramping, largely mechanical sounding in nature.  Advise pre-emptive stretching exercise for calf and back, given.  Can do heat, NSAIDs as needed    Mild thrombocytopenia, no systemic issues.  Continue to monitor with future labs         HEALTH SCREENINGS  Immunizations:  tdap 2014  Flu  :  today  Zoster:   to get pharmacy    Age/Gender Appropriate  screenings:  Colonoscopy 2008,  Normal.  No history of polyps.  No family history of colon cancer.  Fit kit negative in 09/27/2018, reordered  Prostate: psa 2020

## 2020-10-05 ENCOUNTER — PATIENT MESSAGE (OUTPATIENT)
Dept: ADMINISTRATIVE | Facility: HOSPITAL | Age: 65
End: 2020-10-05

## 2020-10-14 ENCOUNTER — LAB VISIT (OUTPATIENT)
Dept: LAB | Facility: HOSPITAL | Age: 65
End: 2020-10-14
Attending: FAMILY MEDICINE
Payer: COMMERCIAL

## 2020-10-14 DIAGNOSIS — Z12.11 COLON CANCER SCREENING: ICD-10-CM

## 2020-10-14 PROCEDURE — 82274 ASSAY TEST FOR BLOOD FECAL: CPT

## 2020-10-19 LAB — HEMOCCULT STL QL IA: NEGATIVE

## 2020-10-19 NOTE — PROGRESS NOTES
Dear Rey Stanley    Your FIT test (colon cancer screen) is negative.  You should repeat this test yearly.    Mark Anthony Chen MD

## 2021-03-22 ENCOUNTER — TELEPHONE (OUTPATIENT)
Dept: FAMILY MEDICINE | Facility: CLINIC | Age: 66
End: 2021-03-22

## 2021-03-22 DIAGNOSIS — Z12.5 SCREENING FOR MALIGNANT NEOPLASM OF PROSTATE: ICD-10-CM

## 2021-03-22 DIAGNOSIS — R73.09 ABNORMAL GLUCOSE: ICD-10-CM

## 2021-03-22 DIAGNOSIS — Z00.00 UNSPECIFIED GENERAL MEDICAL EXAMINATION: Primary | ICD-10-CM

## 2021-03-22 DIAGNOSIS — Z00.00 ROUTINE GENERAL MEDICAL EXAMINATION AT A HEALTH CARE FACILITY: ICD-10-CM

## 2021-03-24 ENCOUNTER — PATIENT MESSAGE (OUTPATIENT)
Dept: FAMILY MEDICINE | Facility: CLINIC | Age: 66
End: 2021-03-24

## 2021-03-30 ENCOUNTER — OFFICE VISIT (OUTPATIENT)
Dept: FAMILY MEDICINE | Facility: CLINIC | Age: 66
End: 2021-03-30
Payer: COMMERCIAL

## 2021-03-30 VITALS
DIASTOLIC BLOOD PRESSURE: 76 MMHG | HEART RATE: 57 BPM | BODY MASS INDEX: 27.32 KG/M2 | HEIGHT: 73 IN | WEIGHT: 206.13 LBS | OXYGEN SATURATION: 99 % | TEMPERATURE: 98 F | SYSTOLIC BLOOD PRESSURE: 122 MMHG

## 2021-03-30 DIAGNOSIS — R73.09 ABNORMAL GLUCOSE: ICD-10-CM

## 2021-03-30 DIAGNOSIS — Z00.00 ROUTINE GENERAL MEDICAL EXAMINATION AT A HEALTH CARE FACILITY: Primary | ICD-10-CM

## 2021-03-30 PROCEDURE — 99999 PR PBB SHADOW E&M-EST. PATIENT-LVL III: CPT | Mod: PBBFAC,,, | Performed by: FAMILY MEDICINE

## 2021-03-30 PROCEDURE — 99999 PR PBB SHADOW E&M-EST. PATIENT-LVL III: ICD-10-PCS | Mod: PBBFAC,,, | Performed by: FAMILY MEDICINE

## 2021-03-30 PROCEDURE — 3008F BODY MASS INDEX DOCD: CPT | Mod: CPTII,S$GLB,, | Performed by: FAMILY MEDICINE

## 2021-03-30 PROCEDURE — 99396 PREV VISIT EST AGE 40-64: CPT | Mod: S$GLB,,, | Performed by: FAMILY MEDICINE

## 2021-03-30 PROCEDURE — 3008F PR BODY MASS INDEX (BMI) DOCUMENTED: ICD-10-PCS | Mod: CPTII,S$GLB,, | Performed by: FAMILY MEDICINE

## 2021-03-30 PROCEDURE — 99396 PR PREVENTIVE VISIT,EST,40-64: ICD-10-PCS | Mod: S$GLB,,, | Performed by: FAMILY MEDICINE

## 2022-03-14 ENCOUNTER — TELEPHONE (OUTPATIENT)
Dept: FAMILY MEDICINE | Facility: CLINIC | Age: 67
End: 2022-03-14
Payer: COMMERCIAL

## 2022-03-14 DIAGNOSIS — R73.09 ABNORMAL GLUCOSE: ICD-10-CM

## 2022-03-14 DIAGNOSIS — Z00.00 ROUTINE GENERAL MEDICAL EXAMINATION AT A HEALTH CARE FACILITY: Primary | ICD-10-CM

## 2022-03-14 DIAGNOSIS — D69.6 THROMBOCYTOPENIA: ICD-10-CM

## 2022-03-15 ENCOUNTER — PATIENT MESSAGE (OUTPATIENT)
Dept: FAMILY MEDICINE | Facility: CLINIC | Age: 67
End: 2022-03-15
Payer: COMMERCIAL

## 2022-03-15 NOTE — TELEPHONE ENCOUNTER
Orders Placed This Encounter   Procedures    CBC Auto Differential    Comprehensive Metabolic Panel    Lipid Panel    TSH    PSA, Screening    Hemoglobin A1C     Can get labs prior to visit.

## 2022-03-30 ENCOUNTER — OFFICE VISIT (OUTPATIENT)
Dept: FAMILY MEDICINE | Facility: CLINIC | Age: 67
End: 2022-03-30
Payer: COMMERCIAL

## 2022-03-30 VITALS
HEIGHT: 73 IN | TEMPERATURE: 98 F | SYSTOLIC BLOOD PRESSURE: 122 MMHG | DIASTOLIC BLOOD PRESSURE: 70 MMHG | OXYGEN SATURATION: 97 % | HEART RATE: 73 BPM | BODY MASS INDEX: 27.55 KG/M2 | WEIGHT: 207.88 LBS

## 2022-03-30 DIAGNOSIS — Z12.11 COLON CANCER SCREENING: ICD-10-CM

## 2022-03-30 DIAGNOSIS — Z00.00 ROUTINE GENERAL MEDICAL EXAMINATION AT A HEALTH CARE FACILITY: Primary | ICD-10-CM

## 2022-03-30 PROCEDURE — 3008F BODY MASS INDEX DOCD: CPT | Mod: CPTII,S$GLB,, | Performed by: FAMILY MEDICINE

## 2022-03-30 PROCEDURE — 1159F PR MEDICATION LIST DOCUMENTED IN MEDICAL RECORD: ICD-10-PCS | Mod: CPTII,S$GLB,, | Performed by: FAMILY MEDICINE

## 2022-03-30 PROCEDURE — 1160F RVW MEDS BY RX/DR IN RCRD: CPT | Mod: CPTII,S$GLB,, | Performed by: FAMILY MEDICINE

## 2022-03-30 PROCEDURE — 3074F SYST BP LT 130 MM HG: CPT | Mod: CPTII,S$GLB,, | Performed by: FAMILY MEDICINE

## 2022-03-30 PROCEDURE — 99396 PREV VISIT EST AGE 40-64: CPT | Mod: S$GLB,,, | Performed by: FAMILY MEDICINE

## 2022-03-30 PROCEDURE — 3044F HG A1C LEVEL LT 7.0%: CPT | Mod: CPTII,S$GLB,, | Performed by: FAMILY MEDICINE

## 2022-03-30 PROCEDURE — 99999 PR PBB SHADOW E&M-EST. PATIENT-LVL IV: CPT | Mod: PBBFAC,,, | Performed by: FAMILY MEDICINE

## 2022-03-30 PROCEDURE — 3078F PR MOST RECENT DIASTOLIC BLOOD PRESSURE < 80 MM HG: ICD-10-PCS | Mod: CPTII,S$GLB,, | Performed by: FAMILY MEDICINE

## 2022-03-30 PROCEDURE — 3074F PR MOST RECENT SYSTOLIC BLOOD PRESSURE < 130 MM HG: ICD-10-PCS | Mod: CPTII,S$GLB,, | Performed by: FAMILY MEDICINE

## 2022-03-30 PROCEDURE — 1159F MED LIST DOCD IN RCRD: CPT | Mod: CPTII,S$GLB,, | Performed by: FAMILY MEDICINE

## 2022-03-30 PROCEDURE — 3044F PR MOST RECENT HEMOGLOBIN A1C LEVEL <7.0%: ICD-10-PCS | Mod: CPTII,S$GLB,, | Performed by: FAMILY MEDICINE

## 2022-03-30 PROCEDURE — 3078F DIAST BP <80 MM HG: CPT | Mod: CPTII,S$GLB,, | Performed by: FAMILY MEDICINE

## 2022-03-30 PROCEDURE — 1160F PR REVIEW ALL MEDS BY PRESCRIBER/CLIN PHARMACIST DOCUMENTED: ICD-10-PCS | Mod: CPTII,S$GLB,, | Performed by: FAMILY MEDICINE

## 2022-03-30 PROCEDURE — 99999 PR PBB SHADOW E&M-EST. PATIENT-LVL IV: ICD-10-PCS | Mod: PBBFAC,,, | Performed by: FAMILY MEDICINE

## 2022-03-30 PROCEDURE — 3008F PR BODY MASS INDEX (BMI) DOCUMENTED: ICD-10-PCS | Mod: CPTII,S$GLB,, | Performed by: FAMILY MEDICINE

## 2022-03-30 PROCEDURE — 99396 PR PREVENTIVE VISIT,EST,40-64: ICD-10-PCS | Mod: S$GLB,,, | Performed by: FAMILY MEDICINE

## 2022-03-30 RX ORDER — PANTOPRAZOLE SODIUM 40 MG/1
40 TABLET, DELAYED RELEASE ORAL DAILY PRN
Qty: 30 TABLET | Refills: 0 | Status: SHIPPED | OUTPATIENT
Start: 2022-03-30 | End: 2022-03-30 | Stop reason: CLARIF

## 2022-03-30 NOTE — PROGRESS NOTES
(Portions of this note were dictated using voice recognition software and may contain dictation related errors in spelling/grammar/syntax not found on text review)    CC:   Chief Complaint   Patient presents with    Annual Exam       HPI: 66 y.o. male here for annual exam no chronic medications.    Has followed with Dermatology prior, Dr. Doyle, for BCC/SCC    Mildly elevated glucose 115, A1c normal range at 5.5.  Prior Mildly depressed platelet 142,000 although normalized on recent check    Active at work but no other advocated exercise.  Tries to eat healthfully    Occasionally gets some upper right neck and shoulder pain if he is sitting for too long.  If he gets up and moves around usually gets better.  No radiation down the arm.       Past Medical History:   Diagnosis Date    Basal cell carcinoma     left ear    DDD (degenerative disc disease), lumbar     IFG (impaired fasting glucose)     Squamous cell carcinoma of head and neck        Past Surgical History:   Procedure Laterality Date    CHOLECYSTECTOMY      SKIN LESION EXCISION         Family History   Problem Relation Age of Onset    Heart disease Mother         mid 60s unknown if <65    Diabetes Mother     Diabetes Father     Cancer Sister         breast    Cancer Sister         breast    Cancer Sister         breast    Thyroid cancer Sister     Coronary artery disease Paternal Grandfather 74        CABG    Heart disease Paternal Uncle         late 60s    Prostate cancer Neg Hx     Colon cancer Neg Hx        Social History     Socioeconomic History    Marital status:      Spouse name: Rita    Number of children: 2   Occupational History    Occupation: management   Tobacco Use    Smoking status: Never Smoker    Smokeless tobacco: Never Used   Substance and Sexual Activity    Alcohol use: Yes     Comment: social    Drug use: Never    Sexual activity: Yes   Social History Narrative    They live in [place]. His kids are  both . He has 2 grandkids, 9 mos and 3. No pets. He likes to play golf and hunt.          Lab Results   Component Value Date    WBC 3.90 03/23/2022    HGB 16.2 03/23/2022    HCT 49.4 03/23/2022     03/23/2022    CHOL 150 03/23/2022    TRIG 83 03/23/2022    HDL 40 03/23/2022    ALT 30 03/23/2022    AST 28 03/23/2022     03/23/2022    K 4.1 03/23/2022     03/23/2022    CREATININE 1.11 03/23/2022    CALCIUM 9.2 03/23/2022    BUN 17 03/23/2022    CO2 27 03/23/2022    TSH 0.989 03/23/2022    PSA 1.3 03/23/2022    HGBA1C 5.5 03/23/2022    LDLCALC 93.4 03/23/2022     (H) 03/23/2022       PSA, Screen   Date Value Ref Range Status   03/23/2022 1.3 0.00 - 4.00 ng/mL Final     Comment:     PSA Expected levels:  Hormonal Therapy: <0.05 ng/ml  Prostatectomy: <0.01 ng/ml  Radiation Therapy: <1.00 ng/ml     03/26/2021 0.85 0.00 - 4.00 ng/mL Final     Comment:     PSA Expected levels:  Hormonal Therapy: <0.05 ng/ml  Prostatectomy: <0.01 ng/ml  Radiation Therapy: <1.00 ng/ml     08/17/2020 0.74 0.00 - 4.00 ng/mL Final     Comment:     PSA Expected levels:  Hormonal Therapy: <0.05 ng/ml  Prostatectomy: <0.01 ng/ml  Radiation Therapy: <1.00 ng/ml     08/15/2019 0.79 0.00 - 4.00 ng/mL Final     Comment:     PSA Expected levels:  Hormonal Therapy: <0.05 ng/ml  Prostatectomy: <0.01 ng/ml  Radiation Therapy: <1.00 ng/ml     09/07/2018 0.70 0.00 - 4.00 ng/mL Final     Comment:     PSA Expected levels:  Hormonal Therapy: <0.05 ng/ml  Prostatectomy: <0.01 ng/ml  Radiation Therapy: <1.00 ng/ml           Platelets (K/uL)   Date Value   03/23/2022 157   03/26/2021 142 (L)   08/17/2020 145 (L)   08/15/2019 152   09/07/2018 141 (L)   09/12/2017 157   11/02/2016 153               Vital signs reviewed  PE:   APPEARANCE: Well nourished, well developed, in no acute distress.    HEAD: Normocephalic, atraumatic.  EYES:  Conjunctivae noninjected.  NECK: Supple with no cervical lymphadenopathy.  No carotid bruits.  No  thyromegaly  CHEST: Good inspiratory effort. Lungs clear to auscultation with no wheezes or crackles.  CARDIOVASCULAR: Normal S1, S2. No rubs, murmurs, or gallops.  ABDOMEN: Bowel sounds normal. Not distended. Soft. No tenderness or masses. No organomegaly.  EXTREMITIES: No edema, cyanosis, or clubbing.  BACK/MSK :no significant tenderness midline or along trapezius.  2+ biceps and brachioradialis reflexes bilaterally.  Normal upper extremity strength with , biceps, deltoids bilaterally.  Normal neck range of motion    IMPRESSION  1. Routine general medical examination at a health care facility    2. Colon cancer screening        PLAN  Mildly elevated glucose, exercise and dietary modification advised.  A1c stable    Mild thrombocytopenia in the past but improved, no systemic issues.  Continue to monitor with future labs         Return 1 year or sooner as needed     HEALTH SCREENINGS  Immunizations:  tdap 2014  Zoster:   to get pharmacy  COVID up-to-date x2, encouraged booster    Age/Gender Appropriate screenings:  Colonoscopy 2008,  Normal.  No history of polyps.  No family history of colon cancer.  Fit kit negative in October 2020, repeat fit ordered  Prostate: psa 2022 above

## 2022-04-21 ENCOUNTER — LAB VISIT (OUTPATIENT)
Dept: LAB | Facility: HOSPITAL | Age: 67
End: 2022-04-21
Attending: FAMILY MEDICINE
Payer: COMMERCIAL

## 2022-04-21 DIAGNOSIS — Z12.11 COLON CANCER SCREENING: ICD-10-CM

## 2022-04-21 PROCEDURE — 82274 ASSAY TEST FOR BLOOD FECAL: CPT | Performed by: FAMILY MEDICINE

## 2022-04-26 LAB — HEMOCCULT STL QL IA: NEGATIVE

## 2023-03-24 ENCOUNTER — TELEPHONE (OUTPATIENT)
Dept: FAMILY MEDICINE | Facility: CLINIC | Age: 68
End: 2023-03-24
Payer: COMMERCIAL

## 2023-03-24 ENCOUNTER — PATIENT MESSAGE (OUTPATIENT)
Dept: FAMILY MEDICINE | Facility: CLINIC | Age: 68
End: 2023-03-24
Payer: COMMERCIAL

## 2023-03-24 DIAGNOSIS — D69.6 THROMBOCYTOPENIA: ICD-10-CM

## 2023-03-24 DIAGNOSIS — Z00.00 ROUTINE GENERAL MEDICAL EXAMINATION AT A HEALTH CARE FACILITY: Primary | ICD-10-CM

## 2023-03-24 DIAGNOSIS — Z12.5 SCREENING FOR MALIGNANT NEOPLASM OF PROSTATE: ICD-10-CM

## 2023-03-24 DIAGNOSIS — R53.83 FATIGUE, UNSPECIFIED TYPE: ICD-10-CM

## 2023-03-24 DIAGNOSIS — R73.09 ABNORMAL GLUCOSE: ICD-10-CM

## 2023-03-24 NOTE — TELEPHONE ENCOUNTER
Will get labs below.  Note for testosterone test this must be done by 8:00 a.m., can not be done later in the morning    Orders Placed This Encounter   Procedures    CBC Auto Differential    Comprehensive Metabolic Panel    Lipid Panel    TSH    PSA, Screening    Testosterone    Hemoglobin A1C

## 2023-04-17 ENCOUNTER — OFFICE VISIT (OUTPATIENT)
Dept: FAMILY MEDICINE | Facility: CLINIC | Age: 68
End: 2023-04-17
Payer: COMMERCIAL

## 2023-04-17 VITALS
HEIGHT: 73 IN | HEART RATE: 67 BPM | OXYGEN SATURATION: 96 % | WEIGHT: 211.44 LBS | SYSTOLIC BLOOD PRESSURE: 122 MMHG | BODY MASS INDEX: 28.02 KG/M2 | TEMPERATURE: 98 F | DIASTOLIC BLOOD PRESSURE: 78 MMHG

## 2023-04-17 DIAGNOSIS — R79.89 ABNORMAL SERUM TESTOSTERONE LEVEL: ICD-10-CM

## 2023-04-17 DIAGNOSIS — R73.09 ABNORMAL GLUCOSE: ICD-10-CM

## 2023-04-17 DIAGNOSIS — Z12.11 COLON CANCER SCREENING: ICD-10-CM

## 2023-04-17 DIAGNOSIS — Z00.00 ROUTINE GENERAL MEDICAL EXAMINATION AT A HEALTH CARE FACILITY: Primary | ICD-10-CM

## 2023-04-17 PROCEDURE — 1159F MED LIST DOCD IN RCRD: CPT | Mod: CPTII,S$GLB,, | Performed by: FAMILY MEDICINE

## 2023-04-17 PROCEDURE — 1101F PR PT FALLS ASSESS DOC 0-1 FALLS W/OUT INJ PAST YR: ICD-10-PCS | Mod: CPTII,S$GLB,, | Performed by: FAMILY MEDICINE

## 2023-04-17 PROCEDURE — 3288F PR FALLS RISK ASSESSMENT DOCUMENTED: ICD-10-PCS | Mod: CPTII,S$GLB,, | Performed by: FAMILY MEDICINE

## 2023-04-17 PROCEDURE — 3078F DIAST BP <80 MM HG: CPT | Mod: CPTII,S$GLB,, | Performed by: FAMILY MEDICINE

## 2023-04-17 PROCEDURE — 1126F PR PAIN SEVERITY QUANTIFIED, NO PAIN PRESENT: ICD-10-PCS | Mod: CPTII,S$GLB,, | Performed by: FAMILY MEDICINE

## 2023-04-17 PROCEDURE — 3074F SYST BP LT 130 MM HG: CPT | Mod: CPTII,S$GLB,, | Performed by: FAMILY MEDICINE

## 2023-04-17 PROCEDURE — 3078F PR MOST RECENT DIASTOLIC BLOOD PRESSURE < 80 MM HG: ICD-10-PCS | Mod: CPTII,S$GLB,, | Performed by: FAMILY MEDICINE

## 2023-04-17 PROCEDURE — 3044F PR MOST RECENT HEMOGLOBIN A1C LEVEL <7.0%: ICD-10-PCS | Mod: CPTII,S$GLB,, | Performed by: FAMILY MEDICINE

## 2023-04-17 PROCEDURE — 99397 PER PM REEVAL EST PAT 65+ YR: CPT | Mod: S$GLB,,, | Performed by: FAMILY MEDICINE

## 2023-04-17 PROCEDURE — 3008F BODY MASS INDEX DOCD: CPT | Mod: CPTII,S$GLB,, | Performed by: FAMILY MEDICINE

## 2023-04-17 PROCEDURE — 1101F PT FALLS ASSESS-DOCD LE1/YR: CPT | Mod: CPTII,S$GLB,, | Performed by: FAMILY MEDICINE

## 2023-04-17 PROCEDURE — 3074F PR MOST RECENT SYSTOLIC BLOOD PRESSURE < 130 MM HG: ICD-10-PCS | Mod: CPTII,S$GLB,, | Performed by: FAMILY MEDICINE

## 2023-04-17 PROCEDURE — 99999 PR PBB SHADOW E&M-EST. PATIENT-LVL IV: ICD-10-PCS | Mod: PBBFAC,,, | Performed by: FAMILY MEDICINE

## 2023-04-17 PROCEDURE — 1159F PR MEDICATION LIST DOCUMENTED IN MEDICAL RECORD: ICD-10-PCS | Mod: CPTII,S$GLB,, | Performed by: FAMILY MEDICINE

## 2023-04-17 PROCEDURE — 1126F AMNT PAIN NOTED NONE PRSNT: CPT | Mod: CPTII,S$GLB,, | Performed by: FAMILY MEDICINE

## 2023-04-17 PROCEDURE — 3008F PR BODY MASS INDEX (BMI) DOCUMENTED: ICD-10-PCS | Mod: CPTII,S$GLB,, | Performed by: FAMILY MEDICINE

## 2023-04-17 PROCEDURE — 3044F HG A1C LEVEL LT 7.0%: CPT | Mod: CPTII,S$GLB,, | Performed by: FAMILY MEDICINE

## 2023-04-17 PROCEDURE — 99999 PR PBB SHADOW E&M-EST. PATIENT-LVL IV: CPT | Mod: PBBFAC,,, | Performed by: FAMILY MEDICINE

## 2023-04-17 PROCEDURE — 99397 PR PREVENTIVE VISIT,EST,65 & OVER: ICD-10-PCS | Mod: S$GLB,,, | Performed by: FAMILY MEDICINE

## 2023-04-17 PROCEDURE — 3288F FALL RISK ASSESSMENT DOCD: CPT | Mod: CPTII,S$GLB,, | Performed by: FAMILY MEDICINE

## 2023-04-17 NOTE — PATIENT INSTRUCTIONS
Look into getting the Shingles vaccine (SHINGRIX) at your local pharmacy (2 part series, done once at/after age 50)    You need one pneumonia shot (prevnar 20) after age 65. You do not need a booster after you get this shot. You can check with your local pharmacy.     Covid booster: updated omicron booster effective Sept 2022:  [email or web address] users can check availability and schedule their vaccinations via [facility]. If you don't have a [facility] account, you can sign up at [email or web address], call [phone] or visit [email or web address] for available locations and times       Nutrition: How to Make Healthier Food Choices     Nutrition: How to Make Healthier Food Choices     Why is healthy eating important?  When combined with exercise, a healthy diet can help you lose weight, lower your cholesterol level and improve the way your body functions on a daily basis.  The U.S. Department of Agricultures (USDA) Food Guide Pyramid divides food into 6 basic food groups, consisting of 1) grains, 2) fruits, 3) vegetables, 4) meats and beans, 5) dairy and 6) fats.  The USDA recommends an adult daily diet include the following:  3 ounces of whole grains, and 6 ounces of grains total   2 cups of fruit   2 1/2 cups of vegetables   3 cups fat-free or low-fat dairy   The following are some ways to make healthier food choices and to get the recommended amounts of whole grains, fruits, vegetables and dairy.    Grains  Whole-grain breads are low in fat; they're also high in fiber and complex carbohydrates, which help you feel kwon longer and prevent overeating. Choose breads whose first ingredient says whole in front of the grain, for example, whole wheat flour or whole white flour; enriched or other types of flour have the important fiber and nutrients removed. Choose whole grain breads for sandwiches and as additions to meals.  Avoid rich bakery foods such as donuts, sweet rolls and muffins. These foods can  contain more than 50% fat calories. Snacks such as lynda food cake and gingersnap cookies can satisfy your sweet tooth without adding fat to your diet.  Hot and cold cereals are usually low in fat. But instant cereals with cream may contain high-fat oils or butterfat. Granola cereals may also contain high-fat oils and extra sugars. Look for low-sugar options for both instant and granola cereals.  Avoid fried snacks such as potato chips and tortilla chips. Try the low-fat or baked versions instead.  Instead of this: Try this:   Croissants, biscuits, white breads and rolls Low-fat whole grain breads and rolls (wheat, rye and pumpernickel)   Doughnuts, pastries and scones English muffins and small whole grain bagels   Fried tortillas Soft tortillas (corn or whole wheat)   Sugar cereals and regular granola Oatmeal, low-fat granola and whole-grain cereal   Snack crackers Crackers (animal, davide, rye, soda, saltine, oyster)   Potato or corn chips and buttered popcorn Pretzels (unsalted) and popcorn (unbuttered)   White pasta Whole-wheat pasta   White rice Brown rice   Fried rice, or pasta and rice mixes that contain high-fat sauces Rice or pasta (without egg yolk) with vegetable sauces   All-purpose white flour 100% whole-wheat flour     Fruits and Vegetables  Fruits and vegetables are naturally low in fat. They add flavor and variety to your diet. They also contain fiber, vitamins and minerals.  Margarine, butter, mayonnaise and sour cream add fat to vegetables and fruits. Try using nonfat or low-fat versions of these foods. You can also use nonfat or low-fat yogurt or herbs as seasonings instead.  Instead of this: Try this:   Fried vegetables or vegetables served with cream, cheese or butter sauces All vegetables raw, steamed, broiled, baked or tossed with a very small amount of olive oil and salt and pepper   Coconut Fruit (fresh)   French fries, hash browns and potato   chips Baked white or sweet potatoes     Meat,  "Poultry and Fish  Beef, Pork, Veal and Lamb  Baking, broiling and roasting are the healthiest ways to prepare meat. Lean cuts can be pan-broiled or stir-fried. Use either a nonstick pan or nonstick spray coating instead of butter or margarine.  Trim outside fat before cooking. Trim any inside, separable fat before eating. Select low-fat, lean cuts of meat. Lean beef and veal cuts have the word "loin" or "round" in their names. Lean pork cuts have the word "loin" or "leg" in their names.  Use herbs, spices, fresh vegetables and nonfat marinades to season meat. Avoid high-fat sauces and gravies.  Poultry  Baking, broiling and roasting are the healthiest ways to prepare poultry. Skinless poultry can be pan-broiled or stir-fried. Use either a nonstick pan or nonstick spray coating instead of butter or margarine.  Remove skin and visible fat before cooking. Chicken breasts are a good choice because they are low in fat and high in protein. Use domestic goose and duck only once in a while because both are high in fat.  Fish  Poaching, steaming, baking and broiling are the healthiest ways to prepare fish. Fresh fish should have a clear color, a moist look, a clean smell and firm, springy flesh. If good-quality fresh fish isn't available, buy frozen fish.  Most seafood is high in healthy polyunsaturated fat. Omega-3 fatty acids are also found in some fatty fish, such as salmon and cold-water trout. They may help lower the risk of heart disease in some people.  Cross-over Foods  Dry beans, peas and lentils offer protein and fiber without the cholesterol and fat of meats. Once in a while, try substituting beans for meat in a favorite recipe, such as lasagna or chili.  TVP, or textured vegetable protein, is widely available in many foods. Vegetarian "hot dogs," "hamburger" and "chicken nuggets" are low-fat, cholesterol-free alternatives to meat.  Instead of this: Try this:   Regular or breaded fish sticks or cakes, fish canned " in oil, seafood prepared with butter or served in high-fat sauce Fish (fresh, frozen, canned in water), low-fat fish sticks or cakes and shellfish (such as shrimp)   Prime and marbled cuts Select-grade lean beef (round, sirloin and loin)   Pork spare ribs and reddy Lean pork (tenderloin and loin chop) and turkey reddy   Regular ground beef Lean or extra-lean ground beef, ground chicken and turkey breast   Lunch meats such as pepperoni, salami, bologna and liverwurst Lean lunch meats such as turkey, chicken and ham   Regular hot dogs or sausage Fat-free hot dogs and turkey dogs     Dairy  Choose skim milk or low-fat buttermilk. Substitute evaporated skim milk for cream in recipes for soups, sauces and coffee.  Try low-fat cheeses. Skim ricotta can replace cream cheese on a bagel or in a vegetable dip. Use part-skim cheeses in recipes. Use 1% cottage cheese for salads and cooking. String cheese is a low-fat, high-calcium snack option.  Plain nonfat yogurt can replace sour cream in many recipes. (To maintain texture, stir 1 tablespoon of cornstarch into each cup of yogurt that you use in cooking.) Try mixing frozen nonfat or low-fat yogurt with fruit for dessert.  Skim sherbet is an alternative to ice cream. Soft-serve and regular ice creams are also lower in fat than premium styles.  Instead of this: Try this:   Whole or 2% milk Non-fat or 1% milk   Evaporated milk Evaporated non-fat milk   Regular buttermilk Buttermilk made from non-fat (or 1%) milk   Yogurt made with whole milk Nonfat or low-fat yogurt   Regular cheese (examples: American, blue, Brie, cheddar, Daron and Parmesan) Low-fat cheese with less than 3 grams of fat per serving (example: natural cheese, processed cheese and nondairy cheese such as soy cheese)   Regular cottage cheese Low-fat, nonfat, and dry-curd cottage cheese with less than 2% fat   Regular cream cheese Low-fat cream cheese (no more than 3 grams of fat per ounce)   Regular ice cream  Sorbet, sherbet and nonfat or low-fat ice cream (no more than 3 grams of fat per 1/2 cup serving)     Fats, Oils and Sweets  Eating too many high-fat foods not only adds excess calories (which can lead to obesity and weight gain), but can increase your risk factor for several diseases. Heart disease, diabetes, certain types of cancer and osteoarthritis have all been linked to diets too high in fat. If you consume too much saturated and trans fats, you are more likely to develop high cholesterol and coronary artery disease.  Sugar-sweetened drinks, such as fruit juice, fruit drinks, regular soft drinks, sports drinks, energy drinks, sweetened or flavored milk and sweetened iced tea can add lots of sugar and calories to your diet. But staying hydrated is important for good health. Substitute water, zero-calorie flavored water, non-fat or reduced-fat milk, unsweetened tea or diet soda for sweetened drinks. Talk with your family doctor or a dietitian if you have questions about your diet or healthy eating for your family.  Instead of this: Try this:   Cookies Fig bars, gingersnaps and molasses cookies   Shortening, butter or margarine Olive, soybean and canola oils   Regular mayonnaise Nonfat or light mayonnaise   Regular salad dressing Nonfat or light salad dressing   Using fat (including butter) to grease pan Nonstick cooking spray

## 2023-04-17 NOTE — PROGRESS NOTES
(Portions of this note were dictated using voice recognition software and may contain dictation related errors in spelling/grammar/syntax not found on text review)    CC:   Chief Complaint   Patient presents with    Annual Exam       HPI: 67 y.o. male here for annual exam no chronic medications.    Has followed with Dermatology prior, Dr. Doyle, for BCC/SCC    Mildly elevated glucose 109, A1c normal range at 5.4.       Gym 3x weekly, diet needs work.      Did request testosterone test which was.  Denies any fatigue, depression symptoms.  Denies any significant loss of libido although he states that erection is sometimes difficult.    Past Medical History:   Diagnosis Date    Basal cell carcinoma     left ear    DDD (degenerative disc disease), lumbar     IFG (impaired fasting glucose)     Squamous cell carcinoma of head and neck        Past Surgical History:   Procedure Laterality Date    CHOLECYSTECTOMY      SKIN LESION EXCISION         Family History   Problem Relation Age of Onset    Heart disease Mother         mid 60s unknown if <65    Diabetes Mother     Diabetes Father     Cancer Sister         breast    Cancer Sister         breast    Cancer Sister         breast    Thyroid cancer Sister     Coronary artery disease Paternal Grandfather 74        CABG    Heart disease Paternal Uncle         late 60s    Prostate cancer Neg Hx     Colon cancer Neg Hx        Social History     Socioeconomic History    Marital status:      Spouse name: Rita    Number of children: 2   Occupational History    Occupation: management   Tobacco Use    Smoking status: Never    Smokeless tobacco: Never   Substance and Sexual Activity    Alcohol use: Yes     Comment: social    Drug use: Never    Sexual activity: Yes   Social History Narrative    They live in [place]. His kids are both . He has 2 grandkids, 9 mos and 3. No pets. He likes to play golf and hunt.          Lab Results   Component Value Date    WBC 4.25  04/05/2023    HGB 16.5 04/05/2023    HCT 49.1 04/05/2023    MCV 92 04/05/2023     04/05/2023    CHOL 150 04/05/2023    TRIG 71 04/05/2023    HDL 41 04/05/2023    ALT 20 04/05/2023    AST 23 04/05/2023    BILITOT 1.4 (H) 04/05/2023    ALKPHOS 73 04/05/2023     04/05/2023    K 4.2 04/05/2023     04/05/2023    CREATININE 0.93 04/05/2023    ESTGFRAFRICA >60.0 03/23/2022    EGFRNONAA >60.0 03/23/2022    EGFRNORACEVR >60.0 04/05/2023    CALCIUM 9.1 04/05/2023    ALBUMIN 4.2 04/05/2023    BUN 13 04/05/2023    CO2 27 04/05/2023    TSH 1.150 04/05/2023    PSA 1.1 04/05/2023    HGBA1C 5.4 04/05/2023    LDLCALC 94.8 04/05/2023     04/05/2023    [id number] 30 08/15/2019             PSA, Screen   Date Value Ref Range Status   04/05/2023 1.1 0.00 - 4.00 ng/mL Final     Comment:     The testing method is a chemiluminescent microparticle immunoassay   manufactured by [employer] and performed on the [employer]   or   [employer] system. Values obtained with different assay manufacturers   for   methods may be different and cannot be used interchangeably.  PSA Expected levels:  Hormonal Therapy: <0.05 ng/ml  Prostatectomy: <0.01 ng/ml  Radiation Therapy: <1.00 ng/ml     03/23/2022 1.3 0.00 - 4.00 ng/mL Final     Comment:     PSA Expected levels:  Hormonal Therapy: <0.05 ng/ml  Prostatectomy: <0.01 ng/ml  Radiation Therapy: <1.00 ng/ml     03/26/2021 0.85 0.00 - 4.00 ng/mL Final     Comment:     PSA Expected levels:  Hormonal Therapy: <0.05 ng/ml  Prostatectomy: <0.01 ng/ml  Radiation Therapy: <1.00 ng/ml     08/17/2020 0.74 0.00 - 4.00 ng/mL Final     Comment:     PSA Expected levels:  Hormonal Therapy: <0.05 ng/ml  Prostatectomy: <0.01 ng/ml  Radiation Therapy: <1.00 ng/ml     08/15/2019 0.79 0.00 - 4.00 ng/mL Final     Comment:     PSA Expected levels:  Hormonal Therapy: <0.05 ng/ml  Prostatectomy: <0.01 ng/ml  Radiation Therapy: <1.00 ng/ml           Platelets (K/uL)   Date Value   04/05/2023 158    03/23/2022 157   03/26/2021 142 (L)   08/17/2020 145 (L)   08/15/2019 152   09/07/2018 141 (L)   09/12/2017 157               Vital signs reviewed  Wt Readings from Last 7 Encounters:   04/17/23 95.9 kg (211 lb 6.7 oz)   03/30/22 94.3 kg (207 lb 14.3 oz)   03/30/21 93.5 kg (206 lb 2.1 oz)   09/21/20 90.7 kg (199 lb 15.3 oz)   07/13/20 90.8 kg (200 lb 2.8 oz)   03/05/20 93.1 kg (205 lb 4 oz)   08/16/19 92.3 kg (203 lb 8 oz)       Vital signs reviewed  PE:   APPEARANCE: Well nourished, well developed, in no acute distress.    HEAD: Normocephalic, atraumatic.  EYES: PERRL. EOMI.   Conjunctivae noninjected.  EARS: TM's intact. Light reflex normal. No retraction or perforation  NOSE: Mucosa pink. Airway clear.  MOUTH & THROAT: No tonsillar enlargement. No pharyngeal erythema or exudate.   NECK: Supple with no cervical lymphadenopathy.    CHEST: Good inspiratory effort. Lungs clear to auscultation with no wheezes or crackles.  CARDIOVASCULAR: Normal S1, S2. No rubs, murmurs, or gallops.  ABDOMEN: Bowel sounds normal. Not distended. Soft. No tenderness or masses. No organomegaly.  EXTREMITIES: No edema       IMPRESSION  1. Routine general medical examination at a health care facility    2. Abnormal glucose    3. Abnormal serum testosterone level    4. Colon cancer screening          PLAN  Mildly elevated glucose, exercise and dietary modification advised.  A1c stable    Mild thrombocytopenia in the past but improved, no systemic issues.  Continue to monitor with future labs    Mildly low testosterone level, no systemic clinical symptoms.  Does complain of some ED without significant loss of libido although states that it is not what it once was.. Discussed that this may or may not be related to low testosterone level.  Discussed rationale behind testosterone assessment and follow-up.  We discussed potential for PD 5 inhibitor therapy for ED since that his his main symptom.  He is not interested at this time.  Discussed  not much of an indication for further follow-up for testosterone replacement unless clinical symptoms are persisting.  If there is a concern, would recommend repeating in 8:00 a.m. testosterone and getting other labs as well such as LH FSH and prolactin.  He is okay with holding off on further assessment at this time but can let me know if he feels like getting these rechecked     Return 1 year or sooner as needed     HEALTH SCREENINGS  Immunizations:  tdap 2014  Zoster:   to get at pharmacy  COVID up-to-date x2, recommend bivalent booster  Prevnar 20 due, defers today, can get at pharmacy      Age/Gender Appropriate screenings:  Colonoscopy 2008,  Normal.  No history of polyps.  No family history of colon cancer.  Fit kit negative 04/26/2022, update fit kit    Prostate: psa 2023 above

## 2023-04-26 ENCOUNTER — LAB VISIT (OUTPATIENT)
Dept: LAB | Facility: HOSPITAL | Age: 68
End: 2023-04-26
Attending: FAMILY MEDICINE
Payer: COMMERCIAL

## 2023-04-26 DIAGNOSIS — Z12.11 COLON CANCER SCREENING: ICD-10-CM

## 2023-04-26 PROCEDURE — 82274 ASSAY TEST FOR BLOOD FECAL: CPT | Performed by: FAMILY MEDICINE

## 2023-05-02 LAB — HEMOCCULT STL QL IA: NEGATIVE

## 2023-07-12 ENCOUNTER — TELEPHONE (OUTPATIENT)
Dept: FAMILY MEDICINE | Facility: CLINIC | Age: 68
End: 2023-07-12
Payer: COMMERCIAL

## 2023-07-12 NOTE — TELEPHONE ENCOUNTER
Returned call to patient. He was seen on urgent care. Advised to call if he's not feeling better in the next few days.

## 2023-07-12 NOTE — TELEPHONE ENCOUNTER
----- Message from Page Chong sent at 7/12/2023  8:10 AM CDT -----  Type:  Needs Medical Advice    Who Called: pt    Would the patient rather a call back or a response via [facility]? call  Best Call Back Number: [phone]  Additional Information: pt would like to be seen for congestion /bad cough/chest pain pt is requesting to be sen today if possible

## 2024-03-05 ENCOUNTER — PATIENT MESSAGE (OUTPATIENT)
Dept: FAMILY MEDICINE | Facility: CLINIC | Age: 69
End: 2024-03-05
Payer: MEDICARE

## 2024-03-05 ENCOUNTER — TELEPHONE (OUTPATIENT)
Dept: FAMILY MEDICINE | Facility: CLINIC | Age: 69
End: 2024-03-05
Payer: MEDICARE

## 2024-03-05 DIAGNOSIS — D69.6 THROMBOCYTOPENIA: ICD-10-CM

## 2024-03-05 DIAGNOSIS — R73.09 ABNORMAL GLUCOSE: ICD-10-CM

## 2024-03-05 DIAGNOSIS — Z00.00 ROUTINE GENERAL MEDICAL EXAMINATION AT A HEALTH CARE FACILITY: Primary | ICD-10-CM

## 2024-03-05 DIAGNOSIS — E04.2 MULTIPLE THYROID NODULES: Primary | ICD-10-CM

## 2024-03-05 DIAGNOSIS — Z12.5 SCREENING FOR MALIGNANT NEOPLASM OF PROSTATE: ICD-10-CM

## 2024-03-05 NOTE — TELEPHONE ENCOUNTER
Called patient to let him know he can expect a call to schedule a thyroid ultrasound. He verbalized understanding.

## 2024-03-05 NOTE — TELEPHONE ENCOUNTER
----- Message from Bhavesh Turk sent at 3/4/2024  2:57 PM CST -----  Contact: Pt  .Type:  Needs Medical Advice    Who Called: pt    Would the patient rather a call back or a response via [facility]?  Call back  Best Call Back Number: [phone]  Additional Information:  Pt.  is requesting  a call back from Cyndi regarding his recent scan.

## 2024-03-05 NOTE — TELEPHONE ENCOUNTER
Returned patient's call and scheduled appointment. He also requested an appointment for annual labs. Scheduled the appointment and ordered CMP, CBC, lipid panel, and A1c. Would you like to order any additional labs to link to the appointment?    He is also going to upload some scans he had done at work regarding his thyroid.

## 2024-03-05 NOTE — TELEPHONE ENCOUNTER
Will get dedicated thyroid ultrasound here to further evaluate into characterize whether he needs further workup.

## 2024-04-22 ENCOUNTER — OFFICE VISIT (OUTPATIENT)
Dept: FAMILY MEDICINE | Facility: CLINIC | Age: 69
End: 2024-04-22
Payer: MEDICARE

## 2024-04-22 VITALS
SYSTOLIC BLOOD PRESSURE: 132 MMHG | HEIGHT: 73 IN | OXYGEN SATURATION: 97 % | WEIGHT: 209.88 LBS | BODY MASS INDEX: 27.82 KG/M2 | DIASTOLIC BLOOD PRESSURE: 76 MMHG | HEART RATE: 68 BPM | TEMPERATURE: 98 F

## 2024-04-22 DIAGNOSIS — S76.311A STRAIN OF RIGHT HAMSTRING, INITIAL ENCOUNTER: ICD-10-CM

## 2024-04-22 DIAGNOSIS — E04.2 MULTIPLE THYROID NODULES: ICD-10-CM

## 2024-04-22 DIAGNOSIS — Z12.5 SCREENING FOR MALIGNANT NEOPLASM OF PROSTATE: ICD-10-CM

## 2024-04-22 DIAGNOSIS — Z00.00 ROUTINE GENERAL MEDICAL EXAMINATION AT A HEALTH CARE FACILITY: Primary | ICD-10-CM

## 2024-04-22 DIAGNOSIS — N28.1 RENAL CYST: ICD-10-CM

## 2024-04-22 DIAGNOSIS — M75.22 BICEPS TENDINITIS ON LEFT: ICD-10-CM

## 2024-04-22 DIAGNOSIS — Z12.11 COLON CANCER SCREENING: ICD-10-CM

## 2024-04-22 DIAGNOSIS — R73.09 ABNORMAL GLUCOSE: ICD-10-CM

## 2024-04-22 PROCEDURE — 99999 PR PBB SHADOW E&M-EST. PATIENT-LVL IV: CPT | Mod: PBBFAC,,, | Performed by: FAMILY MEDICINE

## 2024-04-22 PROCEDURE — 99213 OFFICE O/P EST LOW 20 MIN: CPT | Mod: 25,S$GLB,, | Performed by: FAMILY MEDICINE

## 2024-04-22 PROCEDURE — 99397 PER PM REEVAL EST PAT 65+ YR: CPT | Mod: S$GLB,,, | Performed by: FAMILY MEDICINE

## 2024-04-22 PROCEDURE — 1101F PT FALLS ASSESS-DOCD LE1/YR: CPT | Mod: CPTII,S$GLB,, | Performed by: FAMILY MEDICINE

## 2024-04-22 PROCEDURE — 1159F MED LIST DOCD IN RCRD: CPT | Mod: CPTII,S$GLB,, | Performed by: FAMILY MEDICINE

## 2024-04-22 PROCEDURE — 3288F FALL RISK ASSESSMENT DOCD: CPT | Mod: CPTII,S$GLB,, | Performed by: FAMILY MEDICINE

## 2024-04-22 PROCEDURE — 3075F SYST BP GE 130 - 139MM HG: CPT | Mod: CPTII,S$GLB,, | Performed by: FAMILY MEDICINE

## 2024-04-22 PROCEDURE — 3078F DIAST BP <80 MM HG: CPT | Mod: CPTII,S$GLB,, | Performed by: FAMILY MEDICINE

## 2024-04-22 PROCEDURE — 1126F AMNT PAIN NOTED NONE PRSNT: CPT | Mod: CPTII,S$GLB,, | Performed by: FAMILY MEDICINE

## 2024-04-22 PROCEDURE — 3044F HG A1C LEVEL LT 7.0%: CPT | Mod: CPTII,S$GLB,, | Performed by: FAMILY MEDICINE

## 2024-04-22 PROCEDURE — 3008F BODY MASS INDEX DOCD: CPT | Mod: CPTII,S$GLB,, | Performed by: FAMILY MEDICINE

## 2024-04-22 RX ORDER — MULTIVITAMIN
1 TABLET ORAL DAILY
COMMUNITY

## 2024-04-22 NOTE — PATIENT INSTRUCTIONS
Check with your pharmacy regarding getting the tetanus (Tdap) vaccine (once every 10 years)    Look into getting the Shingles vaccine (SHINGRIX) at your local pharmacy (2 part series, done once at/after age 50)    Covid booster: updated formula omicron booster effective Sept 2023:  [facility] users can check availability and schedule their vaccinations via [facility]. If you don't have a [facility] account, you can sign up at [email or web address], call [phone] or visit [email or web address] for available locations and times     Check with pharmacy about Prevnar 20 vaccine, one time pneumococcal bacteria vaccination recommended at or after 65 years of age.

## 2024-04-22 NOTE — PROGRESS NOTES
(Portions of this note were dictated using voice recognition software and may contain dictation related errors in spelling/grammar/syntax not found on text review)    CC:   Chief Complaint   Patient presents with    Annual Exam    Elbow Pain     Left elbow; pain when squeezing or lifting something heavy    Hip Pain     When driving; right hip and leg       HPI: 68 y.o. male here for annual wellness exam, had recent labs   Additionally has acute issues further discussed below.    Has followed with Dermatology prior, Dr. Doyle, for BCC/SCC    Mildly elevated glucose 104, A1c normal range at 5.3.       Gym 3x weekly, diet needs work.     Did outside health screening which determine thyroid nodule.  Ultrasound 03/05/2024 showed multiple cystic and solid nodules throughout thyroid measuring largest at 1 cm, no concerning nodules meeting FNA criteria.    Also on the above outside screening was found to have a renal cyst.  Was concerned because his brother had similar finding and then it being malignancy of the kidney.    Past Medical History:   Diagnosis Date    Basal cell carcinoma     left ear    DDD (degenerative disc disease), lumbar     IFG (impaired fasting glucose)     Squamous cell carcinoma of head and neck        Past Surgical History:   Procedure Laterality Date    CHOLECYSTECTOMY      SKIN LESION EXCISION         Family History   Problem Relation Name Age of Onset    Heart disease Mother          mid 60s unknown if <65    Diabetes Mother      Diabetes Father      Cancer Sister          breast    Cancer Sister          breast    Cancer Sister          breast    Thyroid cancer Sister      Kidney cancer Brother      Coronary artery disease Paternal Grandfather  74        CABG    Heart disease Paternal Uncle          late 60s    Prostate cancer Neg Hx      Colon cancer Neg Hx         Social History     Socioeconomic History    Marital status:      Spouse name: Rita    Number of children: 2    Occupational History    Occupation: management   Tobacco Use    Smoking status: Never    Smokeless tobacco: Never   Substance and Sexual Activity    Alcohol use: Yes     Comment: social    Drug use: Never    Sexual activity: Yes   Social History Narrative    They live in [place]. His kids are both . He has 2 grandkids, 9 mos and 3. No pets. He likes to play golf and hunt.      Social Determinants of Health     Financial Resource Strain: Patient Declined (4/19/2024)    Overall Financial Resource Strain (CARDIA)     Difficulty of Paying Living Expenses: Patient declined   Food Insecurity: Patient Declined (4/19/2024)    Hunger Vital Sign     Worried About Running Out of Food in the Last Year: Patient declined     Ran Out of Food in the Last Year: Patient declined   Transportation Needs: Unknown (4/19/2024)    PRAPARE - Transportation     Lack of Transportation (Medical): Patient declined   Physical Activity: Insufficiently Active (4/19/2024)    Exercise Vital Sign     Days of Exercise per Week: 1 day     Minutes of Exercise per Session: 60 min   Stress: No Stress Concern Present (4/19/2024)    [facility] [employer] - Occupational Stress Questionnaire     Feeling of Stress : Not at all   Social Connections: Unknown (4/19/2024)    Social Connection and Isolation Panel [NHANES]     Frequency of Communication with Friends and Family: Patient declined     Frequency of Social Gatherings with Friends and Family: Patient declined     Active Member of Clubs or Organizations: Patient declined     Attends Club or Organization Meetings: Patient declined     Marital Status:    Housing Stability: Unknown (4/19/2024)    Housing Stability Vital Sign     Unable to Pay for Housing in the Last Year: Patient declined         Lab Results   Component Value Date    WBC 4.53 04/15/2024    HGB 16.2 04/15/2024    HCT 47.3 04/15/2024    MCV 91 04/15/2024     04/15/2024    CHOL 151 04/15/2024    TRIG  63 04/15/2024    HDL 42 04/15/2024    ALT 18 04/15/2024    AST 17 04/15/2024    BILITOT 1.5 (H) 04/15/2024    ALKPHOS 86 04/15/2024     04/15/2024    K 3.8 04/15/2024     04/15/2024    CREATININE 0.9 04/15/2024    ESTGFRAFRICA >60.0 03/23/2022    EGFRNONAA >60.0 03/23/2022    EGFRNORACEVR >60.0 04/15/2024    CALCIUM 9.2 04/15/2024    ALBUMIN 4.0 04/15/2024    BUN 13 04/15/2024    CO2 26 04/15/2024    TSH 0.973 04/15/2024    PSA 1.5 04/15/2024    HGBA1C 5.3 04/15/2024    LDLCALC 96.4 04/15/2024     04/15/2024    [id number] 30 08/15/2019             PSA, Screen   Date Value Ref Range Status   04/15/2024 1.5 0.00 - 4.00 ng/mL Final     Comment:     The testing method is a chemiluminescent microparticle immunoassay   manufactured by [employer] and performed on the [employer]   or   [employer] system. Values obtained with different assay manufacturers   for   methods may be different and cannot be used interchangeably.  PSA Expected levels:  Hormonal Therapy: <0.05 ng/ml  Prostatectomy: <0.01 ng/ml  Radiation Therapy: <1.00 ng/ml     04/05/2023 1.1 0.00 - 4.00 ng/mL Final     Comment:     The testing method is a chemiluminescent microparticle immunoassay   manufactured by [employer] and performed on the [employer]   or   [employer] system. Values obtained with different assay manufacturers   for   methods may be different and cannot be used interchangeably.  PSA Expected levels:  Hormonal Therapy: <0.05 ng/ml  Prostatectomy: <0.01 ng/ml  Radiation Therapy: <1.00 ng/ml     03/23/2022 1.3 0.00 - 4.00 ng/mL Final     Comment:     PSA Expected levels:  Hormonal Therapy: <0.05 ng/ml  Prostatectomy: <0.01 ng/ml  Radiation Therapy: <1.00 ng/ml     03/26/2021 0.85 0.00 - 4.00 ng/mL Final     Comment:     PSA Expected levels:  Hormonal Therapy: <0.05 ng/ml  Prostatectomy: <0.01 ng/ml  Radiation Therapy: <1.00 ng/ml     08/17/2020 0.74 0.00 - 4.00 ng/mL Final     Comment:     PSA Expected  levels:  Hormonal Therapy: <0.05 ng/ml  Prostatectomy: <0.01 ng/ml  Radiation Therapy: <1.00 ng/ml           Platelets (K/uL)   Date Value   04/15/2024 156   04/05/2023 158   03/23/2022 157   03/26/2021 142 (L)   08/17/2020 145 (L)   08/15/2019 152   09/07/2018 141 (L)               Vital signs reviewed  Wt Readings from Last 7 Encounters:   04/22/24 95.2 kg (209 lb 14.1 oz)   04/17/23 95.9 kg (211 lb 6.7 oz)   03/30/22 94.3 kg (207 lb 14.3 oz)   03/30/21 93.5 kg (206 lb 2.1 oz)   09/21/20 90.7 kg (199 lb 15.3 oz)   07/13/20 90.8 kg (200 lb 2.8 oz)   03/05/20 93.1 kg (205 lb 4 oz)       Vital signs reviewed  PE:   APPEARANCE: Well nourished, well developed, in no acute distress.    HEAD: Normocephalic, atraumatic.  EYES: PERRL. EOMI.   Conjunctivae noninjected.  EARS: TM's intact. Light reflex normal. No retraction or perforation  NOSE: Mucosa pink. Airway clear.  MOUTH & THROAT: No tonsillar enlargement. No pharyngeal erythema or exudate.   NECK: Supple with no cervical lymphadenopathy.    CHEST: Good inspiratory effort. Lungs clear to auscultation with no wheezes or crackles.  CARDIOVASCULAR: Normal S1, S2. No rubs, murmurs, or gallops.  ABDOMEN: Bowel sounds normal. Not distended. Soft. No tenderness or masses. No organomegaly.  EXTREMITIES: No edema       IMPRESSION  1. Routine general medical examination at a health care facility    2. Multiple thyroid nodules    3. Abnormal glucose    4. Screening for malignant neoplasm of prostate    5. Colon cancer screening    6. Renal cyst            PLAN  Mildly elevated glucose, exercise and dietary modification advised.  A1c stable    Mild thrombocytopenia in the past but improved, no systemic issues.  Continue to monitor with future labs    Will get renal ultrasound to further assess cyst of the kidney noted on outside health screening      Return 1 year or sooner as needed     Orders Placed This Encounter   Procedures    US Retroperitoneal Complete    Fecal  Immunochemical Test (iFOBT)       HEALTH SCREENINGS  Immunizations:  tdap 2014  Zoster:   to get at pharmacy  COVID  recommend booster  Prevnar 20 due, , declines in office today, can get this at pharmacy       Age/Gender Appropriate screenings:  Colonoscopy 2008,  Normal.  No history of polyps.  No family history of colon cancer.  Fit kit negative May 2023, update fit kit    Prostate: psa 2023 above      -----------------------------------------------------------------------------------------------------------------      Evaluation and management note    Chief complaint:  Elbow and hip pain.    HPI:  Left elbow pain :  Has volar pain on the left side just traversing the antecubital fossa, has been going on for the last few months.  Noticed it more in January 2024 when he was at [place] [employer] [place].  Not doing as much activity since then, has gotten a little bit better but still painful whenever he tries to lift anything or supinate his arm.  No direct trauma.  No right-sided symptoms.  He is right-hand dominant    Right hip pain.  Feels pain in his buttock area when he is driving long distances, will radiate down the length of the hamstring to the popliteal region but not below that.  Denies any paresthesias.  Feels like he is about to get a cramp but it does not actually cramp up.  He usually has his while it in his right back pocket but usually takes it out when he drives      Past medical, surgical, family, social history reviewed as above      Vital signs reviewed  PE:   MSK:  Left arm: Pain along course of distal biceps tendon.  No defect.  Pain accentuated with supination and resisted elbow flexion.  No pain of belly of biceps.  No right-sided symptoms.  Bilateral 2+ biceps and brachioradialis reflexes.  Right hip:  No trochanteric pain.  Normal femoroacetabular internal and external rotation.  Negative straight leg raise bilaterally.  2+ patellar and ankle reflexes.  Discomfort at palpation of ischial  tuberosity.    IMPRESSION  Distal biceps tendinitis left   Hamstring strain left      PLAN  Exercises for both pathologies above, strengthening and stretching exercises discuss.  Topical analgesics as necessary

## 2024-05-04 ENCOUNTER — LAB VISIT (OUTPATIENT)
Dept: LAB | Facility: HOSPITAL | Age: 69
End: 2024-05-04
Attending: FAMILY MEDICINE
Payer: MEDICARE

## 2024-05-04 DIAGNOSIS — Z12.11 COLON CANCER SCREENING: ICD-10-CM

## 2024-05-04 PROCEDURE — 82274 ASSAY TEST FOR BLOOD FECAL: CPT | Performed by: FAMILY MEDICINE

## 2024-05-07 LAB — HEMOCCULT STL QL IA: NEGATIVE

## 2025-03-10 ENCOUNTER — TELEPHONE (OUTPATIENT)
Dept: FAMILY MEDICINE | Facility: CLINIC | Age: 70
End: 2025-03-10
Payer: MEDICARE

## 2025-03-10 DIAGNOSIS — E04.2 MULTIPLE THYROID NODULES: Primary | ICD-10-CM

## 2025-03-10 DIAGNOSIS — R73.09 ABNORMAL GLUCOSE: ICD-10-CM

## 2025-03-10 DIAGNOSIS — Z13.6 ENCOUNTER FOR SCREENING FOR CARDIOVASCULAR DISORDERS: ICD-10-CM

## 2025-03-10 DIAGNOSIS — Z12.5 SCREENING FOR MALIGNANT NEOPLASM OF PROSTATE: ICD-10-CM

## 2025-03-10 DIAGNOSIS — D69.6 THROMBOCYTOPENIA: ICD-10-CM

## 2025-03-10 DIAGNOSIS — Z00.00 ROUTINE GENERAL MEDICAL EXAMINATION AT A HEALTH CARE FACILITY: ICD-10-CM

## 2025-03-10 NOTE — TELEPHONE ENCOUNTER
----- Message from Padma sent at 3/10/2025 11:10 AM CDT -----  Type:  Patient  CallWho Called:Pt Would the patient rather a call back or a response via [facility]? Call back Best Call Back Number:[phone] Information: requesting orders for labs for his annual visit on 05/27

## 2025-03-18 ENCOUNTER — TELEPHONE (OUTPATIENT)
Dept: FAMILY MEDICINE | Facility: CLINIC | Age: 70
End: 2025-03-18
Payer: MEDICARE

## 2025-03-31 ENCOUNTER — PATIENT MESSAGE (OUTPATIENT)
Dept: CARDIOLOGY | Facility: CLINIC | Age: 70
End: 2025-03-31
Payer: MEDICARE

## 2025-04-01 ENCOUNTER — OFFICE VISIT (OUTPATIENT)
Dept: CARDIOLOGY | Facility: CLINIC | Age: 70
End: 2025-04-01
Payer: MEDICARE

## 2025-04-01 VITALS
SYSTOLIC BLOOD PRESSURE: 106 MMHG | HEART RATE: 67 BPM | WEIGHT: 207 LBS | HEIGHT: 73 IN | BODY MASS INDEX: 27.43 KG/M2 | DIASTOLIC BLOOD PRESSURE: 66 MMHG | OXYGEN SATURATION: 98 %

## 2025-04-01 DIAGNOSIS — R07.89 CHEST DISCOMFORT: ICD-10-CM

## 2025-04-01 DIAGNOSIS — R00.2 PALPITATIONS: Primary | ICD-10-CM

## 2025-04-01 DIAGNOSIS — R07.89 OTHER CHEST PAIN: ICD-10-CM

## 2025-04-01 PROCEDURE — 1160F RVW MEDS BY RX/DR IN RCRD: CPT | Mod: CPTII,S$GLB,,

## 2025-04-01 PROCEDURE — 99204 OFFICE O/P NEW MOD 45 MIN: CPT | Mod: 25,S$GLB,,

## 2025-04-01 PROCEDURE — 93005 ELECTROCARDIOGRAM TRACING: CPT | Mod: S$GLB,,,

## 2025-04-01 PROCEDURE — 3008F BODY MASS INDEX DOCD: CPT | Mod: CPTII,S$GLB,,

## 2025-04-01 PROCEDURE — 3288F FALL RISK ASSESSMENT DOCD: CPT | Mod: CPTII,S$GLB,,

## 2025-04-01 PROCEDURE — 3078F DIAST BP <80 MM HG: CPT | Mod: CPTII,S$GLB,,

## 2025-04-01 PROCEDURE — 99999 PR PBB SHADOW E&M-EST. PATIENT-LVL III: CPT | Mod: PBBFAC,,,

## 2025-04-01 PROCEDURE — 1126F AMNT PAIN NOTED NONE PRSNT: CPT | Mod: CPTII,S$GLB,,

## 2025-04-01 PROCEDURE — 3074F SYST BP LT 130 MM HG: CPT | Mod: CPTII,S$GLB,,

## 2025-04-01 PROCEDURE — 1159F MED LIST DOCD IN RCRD: CPT | Mod: CPTII,S$GLB,,

## 2025-04-01 PROCEDURE — 1101F PT FALLS ASSESS-DOCD LE1/YR: CPT | Mod: CPTII,S$GLB,,

## 2025-04-01 NOTE — PROGRESS NOTES
"Subjective:    Patient ID:  Rey Stanley is a 69 y.o. male who presents for evaluation of No chief complaint on file.      PCP: Mark Anthony Chen MD     Referring Provider: Mark Anthony Chen MD     HPI: Patient is a 68 yo M w/PMH of DDD, basel cell carcinoma who presents today to establish carte and evaluation of irregular heart beat.    Patient reports episodes of palpitations starting 2 weeks ago. Patient reports palpitation awoken him from his sleep. He also reports intermittent chest discomfort described as a light pressure " someone poking their  finger in his chest. He also reports cough associated w palpitations.   Patient denies orthopnea, PND, presyncope, LOC, swelling, or claudication.   Patient is not current on prescribed medication. Patient does not exercise regularly, but remains active.    Mother MI age 70s, Stepfather MI age late 60s. Maternal grandfather cardiac disease.     Past Medical History:   Diagnosis Date    Basal cell carcinoma     left ear    DDD (degenerative disc disease), lumbar     IFG (impaired fasting glucose)     Squamous cell carcinoma of head and neck      Past Surgical History:   Procedure Laterality Date    CHOLECYSTECTOMY      SKIN LESION EXCISION       Social History[1]  Family History   Problem Relation Name Age of Onset    Heart disease Mother          mid 60s unknown if <65    Diabetes Mother      Diabetes Father      Cancer Sister          breast    Cancer Sister          breast    Cancer Sister          breast    Thyroid cancer Sister      Kidney cancer Brother      Coronary artery disease Paternal Grandfather  74        CABG    Heart disease Paternal Uncle          late 60s    Prostate cancer Neg Hx      Colon cancer Neg Hx         Review of patient's allergies indicates:   Allergen Reactions    No known drug allergies        Medication List with Changes/Refills   Discontinued Medications    MULTIVITAMIN (THERAGRAN) PER TABLET    Take 1 tablet by mouth once " "daily.       Review of Systems   Constitutional: Negative for diaphoresis and fever.   HENT:  Negative for congestion and hearing loss.    Eyes:  Negative for blurred vision and pain.   Cardiovascular:  Positive for chest pain and palpitations. Negative for claudication, dyspnea on exertion, leg swelling, near-syncope and syncope.   Respiratory:  Positive for cough and snoring. Negative for shortness of breath and sleep disturbances due to breathing.    Hematologic/Lymphatic: Negative for bleeding problem. Does not bruise/bleed easily.   Skin:  Negative for color change and poor wound healing.   Gastrointestinal:  Negative for abdominal pain and nausea.   Genitourinary:  Negative for bladder incontinence and flank pain.   Neurological:  Negative for focal weakness and light-headedness.        Objective:   /66 (BP Location: Left arm, Patient Position: Sitting)   Pulse 67   Ht 6' 1" (1.854 m)   Wt 93.9 kg (207 lb 0.2 oz)   SpO2 98%   BMI 27.31 kg/m²    Physical Exam  Constitutional:       Appearance: He is well-developed. He is not diaphoretic.   HENT:      Head: Normocephalic and atraumatic.   Eyes:      General: No scleral icterus.     Pupils: Pupils are equal, round, and reactive to light.   Neck:      Vascular: No JVD.   Cardiovascular:      Rate and Rhythm: Normal rate and regular rhythm.      Pulses: Intact distal pulses.           Radial pulses are 2+ on the right side and 2+ on the left side.        Dorsalis pedis pulses are 2+ on the right side and 2+ on the left side.        Posterior tibial pulses are 2+ on the right side and 2+ on the left side.      Heart sounds: S1 normal and S2 normal. No murmur heard.     No friction rub. No gallop.   Pulmonary:      Effort: Pulmonary effort is normal. No respiratory distress.      Breath sounds: Normal breath sounds. No wheezing or rales.   Chest:      Chest wall: No tenderness.   Abdominal:      General: Bowel sounds are normal. There is no distension.     "  Palpations: Abdomen is soft. There is no mass.      Tenderness: There is no abdominal tenderness. There is no rebound.   Musculoskeletal:         General: No tenderness. Normal range of motion.      Cervical back: Normal range of motion and neck supple.   Skin:     General: Skin is warm and dry.      Coloration: Skin is not pale.   Neurological:      Mental Status: He is alert and oriented to person, place, and time.      Coordination: Coordination normal.      Deep Tendon Reflexes: Reflexes normal.   Psychiatric:         Behavior: Behavior normal.         Judgment: Judgment normal.     EKG reviewed       Assessment:       1. Palpitations    2. Chest discomfort    3. Other chest pain    4. BMI 27.0-27.9,adult         Plan:         Palpitations  -associated with chest discomfort and cough   -in office EKG reviewed Sinus Bradycardia 58   -48 HR Holter to evaluate for arrhythmias   -exercise echo stress test     Other chest pain  -Exercise echo stress test to evaluate for ischemia     BMI 27.0-27.9,adult  -encouraged increased exercise and activity       Total duration of face to face visit time 30 minutes.  Total time spent counseling greater than fifty percent of total visit time.  Counseling included discussion regarding imaging findings, diagnosis, possibilities, treatment options, risks and benefits.  The patient had many questions regarding the options and long-term effects      Ez Doll, RAMU  Cardiology-[facility]                 [1]   Social History  Socioeconomic History    Marital status:      Spouse name: Rita    Number of children: 2   Occupational History    Occupation: management   Tobacco Use    Smoking status: Never     Passive exposure: Never    Smokeless tobacco: Never   Substance and Sexual Activity    Alcohol use: Yes     Comment: social    Drug use: Never    Sexual activity: Yes   Social History Narrative    They live in [place]. His kids are both . He has 2 grandkids, 9 mos and  3. No pets. He likes to play golf and hunt.      Social Drivers of Health     Financial Resource Strain: Patient Declined (4/19/2024)    Overall Financial Resource Strain (CARDIA)     Difficulty of Paying Living Expenses: Patient declined   Food Insecurity: Patient Declined (4/19/2024)    Hunger Vital Sign     Worried About Running Out of Food in the Last Year: Patient declined     Ran Out of Food in the Last Year: Patient declined   Transportation Needs: Unknown (4/19/2024)    PRAPARE - Transportation     Lack of Transportation (Medical): Patient declined   Physical Activity: Insufficiently Active (4/19/2024)    Exercise Vital Sign     Days of Exercise per Week: 1 day     Minutes of Exercise per Session: 60 min   Stress: No Stress Concern Present (4/19/2024)    [place] [employer] [employer] - Occupational Stress Questionnaire     Feeling of Stress : Not at all   Housing Stability: Unknown (4/19/2024)    Housing Stability Vital Sign     Unable to Pay for Housing in the Last Year: Patient declined

## 2025-04-01 NOTE — ASSESSMENT & PLAN NOTE
-associated with chest discomfort and cough   -in office EKG reviewed Sinus Bradycardia 58   -48 HR Holter to evaluate for arrhythmias   -exercise echo stress test

## 2025-04-02 LAB
OHS QRS DURATION: 88 MS
OHS QTC CALCULATION: 402 MS

## 2025-04-21 ENCOUNTER — RESULTS FOLLOW-UP (OUTPATIENT)
Dept: CARDIOLOGY | Facility: CLINIC | Age: 70
End: 2025-04-21

## 2025-04-21 ENCOUNTER — TELEPHONE (OUTPATIENT)
Dept: CARDIOLOGY | Facility: CLINIC | Age: 70
End: 2025-04-21
Payer: MEDICARE

## 2025-04-21 ENCOUNTER — RESULTS FOLLOW-UP (OUTPATIENT)
Dept: FAMILY MEDICINE | Facility: CLINIC | Age: 70
End: 2025-04-21

## 2025-04-21 NOTE — TELEPHONE ENCOUNTER
I reached out to Mr. Stanley and informed Him of his results & HE expressed understanding of the results.    Fabiola Myles  Medical Assistant  [facility]  [phone] - Office  [phone] - Fax        ----- Message from Ez Doll NP sent at 4/21/2025  1:28 PM CDT -----  Please inform, Mr. Stanley the stress test was negative with normal heart function. No additional testing is required at this time.    Thank you,  Ez Doll DNP   ----- Message -----  From: Paul Vyas MD  Sent: 4/20/2025  10:24 PM CDT  To: Ez Doll NP

## 2025-04-23 ENCOUNTER — TELEPHONE (OUTPATIENT)
Dept: FAMILY MEDICINE | Facility: CLINIC | Age: 70
End: 2025-04-23

## 2025-04-23 ENCOUNTER — OFFICE VISIT (OUTPATIENT)
Dept: FAMILY MEDICINE | Facility: CLINIC | Age: 70
End: 2025-04-23
Payer: MEDICARE

## 2025-04-23 VITALS
WEIGHT: 203.69 LBS | HEART RATE: 59 BPM | HEIGHT: 73 IN | TEMPERATURE: 98 F | DIASTOLIC BLOOD PRESSURE: 68 MMHG | OXYGEN SATURATION: 99 % | BODY MASS INDEX: 26.99 KG/M2 | SYSTOLIC BLOOD PRESSURE: 118 MMHG

## 2025-04-23 DIAGNOSIS — N28.1 RENAL CYST: ICD-10-CM

## 2025-04-23 DIAGNOSIS — R73.09 ABNORMAL GLUCOSE: ICD-10-CM

## 2025-04-23 DIAGNOSIS — E04.2 MULTIPLE THYROID NODULES: ICD-10-CM

## 2025-04-23 DIAGNOSIS — Z23 NEED FOR PROPHYLACTIC VACCINATION AGAINST STREPTOCOCCUS PNEUMONIAE (PNEUMOCOCCUS): ICD-10-CM

## 2025-04-23 DIAGNOSIS — D69.6 THROMBOCYTOPENIA: ICD-10-CM

## 2025-04-23 DIAGNOSIS — Z00.00 ROUTINE GENERAL MEDICAL EXAMINATION AT A HEALTH CARE FACILITY: Primary | ICD-10-CM

## 2025-04-23 DIAGNOSIS — Z98.890 HX OF SQUAMOUS CELL CARCINOMA EXCISION: ICD-10-CM

## 2025-04-23 DIAGNOSIS — Z85.9 HX OF SQUAMOUS CELL CARCINOMA EXCISION: ICD-10-CM

## 2025-04-23 DIAGNOSIS — Z12.11 COLON CANCER SCREENING: ICD-10-CM

## 2025-04-23 DIAGNOSIS — Z12.5 SCREENING FOR MALIGNANT NEOPLASM OF PROSTATE: ICD-10-CM

## 2025-04-23 PROCEDURE — G0009 ADMIN PNEUMOCOCCAL VACCINE: HCPCS | Mod: S$GLB,,, | Performed by: FAMILY MEDICINE

## 2025-04-23 PROCEDURE — 3078F DIAST BP <80 MM HG: CPT | Mod: CPTII,S$GLB,, | Performed by: FAMILY MEDICINE

## 2025-04-23 PROCEDURE — 3044F HG A1C LEVEL LT 7.0%: CPT | Mod: CPTII,S$GLB,, | Performed by: FAMILY MEDICINE

## 2025-04-23 PROCEDURE — 99999 PR PBB SHADOW E&M-EST. PATIENT-LVL IV: CPT | Mod: PBBFAC,,, | Performed by: FAMILY MEDICINE

## 2025-04-23 PROCEDURE — 99397 PER PM REEVAL EST PAT 65+ YR: CPT | Mod: 25,S$GLB,, | Performed by: FAMILY MEDICINE

## 2025-04-23 PROCEDURE — 1160F RVW MEDS BY RX/DR IN RCRD: CPT | Mod: CPTII,S$GLB,, | Performed by: FAMILY MEDICINE

## 2025-04-23 PROCEDURE — 3288F FALL RISK ASSESSMENT DOCD: CPT | Mod: CPTII,S$GLB,, | Performed by: FAMILY MEDICINE

## 2025-04-23 PROCEDURE — 1159F MED LIST DOCD IN RCRD: CPT | Mod: CPTII,S$GLB,, | Performed by: FAMILY MEDICINE

## 2025-04-23 PROCEDURE — 90677 PCV20 VACCINE IM: CPT | Mod: S$GLB,,, | Performed by: FAMILY MEDICINE

## 2025-04-23 PROCEDURE — 1126F AMNT PAIN NOTED NONE PRSNT: CPT | Mod: CPTII,S$GLB,, | Performed by: FAMILY MEDICINE

## 2025-04-23 PROCEDURE — 3074F SYST BP LT 130 MM HG: CPT | Mod: CPTII,S$GLB,, | Performed by: FAMILY MEDICINE

## 2025-04-23 PROCEDURE — 3008F BODY MASS INDEX DOCD: CPT | Mod: CPTII,S$GLB,, | Performed by: FAMILY MEDICINE

## 2025-04-23 PROCEDURE — 1101F PT FALLS ASSESS-DOCD LE1/YR: CPT | Mod: CPTII,S$GLB,, | Performed by: FAMILY MEDICINE

## 2025-04-23 NOTE — PROGRESS NOTES
(Portions of this note were dictated using voice recognition software and may contain dictation related errors in spelling/grammar/syntax not found on text review)    CC:   Chief Complaint   Patient presents with    Annual Exam       HPI: 69 y.o. male here for annual wellness exam, had recent labs     Has followed with Dermatology prior, Dr. Doyle, for BCC/SCC    Mildly elevated glucose 104, A1c normal range at 5.4       Was going to the gym but had gotten away from this.  Plans to go back    Did outside health screening which determine thyroid nodule.  Ultrasound 03/05/2024 showed multiple cystic and solid nodules throughout thyroid measuring largest at 1 cm, no concerning nodules meeting FNA criteria.    Also on the above outside screening was found to have a renal cyst.  Was concerned because his brother had similar finding and then it being malignancy of the kidney.  Dedicated renal ultrasound done on 04/23/2024, 4 cm simple cyst right lower pole, 1.3 cm upper pole simple cyst on left.  No concerning findings.    Was having some palpitation issues.  Saw Cardiology.  Holter pending.  EKG sinus bradycardia.  Had stress test done, had some resting PACs, exertional PVCs.  No ischemic signs.  Drinks tea once in awhile but not commonly.  No coffee.  Denies any other caffeine sources.  Rare alcohol.    Past Medical History:   Diagnosis Date    Basal cell carcinoma     left ear    DDD (degenerative disc disease), lumbar     IFG (impaired fasting glucose)     Squamous cell carcinoma of head and neck        Past Surgical History:   Procedure Laterality Date    CHOLECYSTECTOMY      SKIN LESION EXCISION         Family History   Problem Relation Name Age of Onset    Heart disease Mother          mid 60s unknown if <65    Diabetes Mother      Diabetes Father      Cancer Sister          breast    Cancer Sister          breast    Cancer Sister          breast    Thyroid cancer Sister      Kidney cancer Brother      Coronary  artery disease Paternal Grandfather  74        CABG    Heart disease Paternal Uncle          late 60s    Prostate cancer Neg Hx      Colon cancer Neg Hx         Social History     Socioeconomic History    Marital status:      Spouse name: Rita    Number of children: 2   Occupational History    Occupation: management   Tobacco Use    Smoking status: Never     Passive exposure: Never    Smokeless tobacco: Never   Substance and Sexual Activity    Alcohol use: Yes     Comment: social    Drug use: Never    Sexual activity: Yes   Social History Narrative    They live in [place]. His kids are both . He has 2 grandkids, 9 mos and 3. No pets. He likes to play golf and hunt.      Social Drivers of Health     Financial Resource Strain: Patient Declined (4/19/2024)    Overall Financial Resource Strain (CARDIA)     Difficulty of Paying Living Expenses: Patient declined   Food Insecurity: Patient Declined (4/19/2024)    Hunger Vital Sign     Worried About Running Out of Food in the Last Year: Patient declined     Ran Out of Food in the Last Year: Patient declined   Transportation Needs: Unknown (4/19/2024)    PRAPARE - Transportation     Lack of Transportation (Medical): Patient declined   Physical Activity: Insufficiently Active (4/19/2024)    Exercise Vital Sign     Days of Exercise per Week: 1 day     Minutes of Exercise per Session: 60 min   Stress: No Stress Concern Present (4/19/2024)    [place] [employer] [employer] - Occupational Stress Questionnaire     Feeling of Stress : Not at all   Housing Stability: Unknown (4/19/2024)    Housing Stability Vital Sign     Unable to Pay for Housing in the Last Year: Patient declined         Lab Results   Component Value Date    WBC 3.90 04/21/2025    HGB 15.8 04/21/2025    HCT 47.6 04/21/2025    MCV 93 04/21/2025     (L) 04/21/2025    CHOL 136 04/21/2025    TRIG 55 04/21/2025    HDL 42 04/21/2025    ALT 16 04/21/2025    AST 16 04/21/2025    BILITOT  1.3 (H) 04/21/2025    ALKPHOS 74 04/21/2025     04/21/2025    K 4.2 04/21/2025     04/21/2025    CREATININE 1.0 04/21/2025    ESTGFRAFRICA >60.0 03/23/2022    EGFRNONAA >60.0 03/23/2022    EGFRNORACEVR >60 04/21/2025    CALCIUM 9.2 04/21/2025    ALBUMIN 3.9 04/21/2025    BUN 16 04/21/2025    CO2 25 04/21/2025    TSH 1.143 04/21/2025    PSA 1.14 04/21/2025    HGBA1C 5.4 04/21/2025    LDLCALC 96.4 04/15/2024     04/15/2024    XURUJONU91AV 30 08/15/2019             Prostate Specific Antigen   Date Value Ref Range Status   04/21/2025 1.14 <=4.00 ng/mL Final     Comment:     PSA Expected levels:  Hormonal therapy: < 0.05 ng/mL   Prostatectomy: < 0.01 ng/mL   Radiation therapy: < 1.00 ng/mL       PSA, Screen   Date Value Ref Range Status   04/15/2024 1.5 0.00 - 4.00 ng/mL Final     Comment:     The testing method is a chemiluminescent microparticle immunoassay   manufactured by [employer] and performed on the [employer]   or   [employer] system. Values obtained with different assay manufacturers   for   methods may be different and cannot be used interchangeably.  PSA Expected levels:  Hormonal Therapy: <0.05 ng/ml  Prostatectomy: <0.01 ng/ml  Radiation Therapy: <1.00 ng/ml     04/05/2023 1.1 0.00 - 4.00 ng/mL Final     Comment:     The testing method is a chemiluminescent microparticle immunoassay   manufactured by [employer] and performed on the [employer]   or   [employer] system. Values obtained with different assay manufacturers   for   methods may be different and cannot be used interchangeably.  PSA Expected levels:  Hormonal Therapy: <0.05 ng/ml  Prostatectomy: <0.01 ng/ml  Radiation Therapy: <1.00 ng/ml     03/23/2022 1.3 0.00 - 4.00 ng/mL Final     Comment:     PSA Expected levels:  Hormonal Therapy: <0.05 ng/ml  Prostatectomy: <0.01 ng/ml  Radiation Therapy: <1.00 ng/ml     03/26/2021 0.85 0.00 - 4.00 ng/mL Final     Comment:     PSA Expected levels:  Hormonal Therapy: <0.05  ng/ml  Prostatectomy: <0.01 ng/ml  Radiation Therapy: <1.00 ng/ml     08/17/2020 0.74 0.00 - 4.00 ng/mL Final     Comment:     PSA Expected levels:  Hormonal Therapy: <0.05 ng/ml  Prostatectomy: <0.01 ng/ml  Radiation Therapy: <1.00 ng/ml           Platelet Count (K/uL)   Date Value   04/21/2025 141 (L)     Platelets (K/uL)   Date Value   04/15/2024 156   04/05/2023 158   03/23/2022 157   03/26/2021 142 (L)   08/17/2020 145 (L)   08/15/2019 152   09/07/2018 141 (L)               Vital signs reviewed  Wt Readings from Last 7 Encounters:   04/23/25 92.4 kg (203 lb 11.3 oz)   04/17/25 93.9 kg (207 lb)   04/01/25 93.9 kg (207 lb 0.2 oz)   04/22/24 95.2 kg (209 lb 14.1 oz)   04/17/23 95.9 kg (211 lb 6.7 oz)   03/30/22 94.3 kg (207 lb 14.3 oz)   03/30/21 93.5 kg (206 lb 2.1 oz)       Vital signs reviewed  PE:   APPEARANCE: Well nourished, well developed, in no acute distress.    HEAD: Normocephalic, atraumatic.  EYES: PERRL. EOMI.   Conjunctivae noninjected.  EARS: TM's intact. Light reflex normal. No retraction or perforation  NOSE: Mucosa pink. Airway clear.  MOUTH & THROAT: No tonsillar enlargement. No pharyngeal erythema or exudate.   NECK: Supple with no cervical lymphadenopathy.  No carotid bruits.  No thyromegaly  CHEST: Good inspiratory effort. Lungs clear to auscultation with no wheezes or crackles.  CARDIOVASCULAR: Normal S1, S2. No rubs, murmurs, or gallops.  ABDOMEN: Bowel sounds normal. Not distended. Soft. No tenderness or masses. No organomegaly.  EXTREMITIES: No edema       IMPRESSION  1. Routine general medical examination at a health care facility    2. Abnormal glucose    3. Thrombocytopenia    4. Multiple thyroid nodules    5. Screening for malignant neoplasm of prostate    6. Renal cyst    7. Hx of squamous cell carcinoma excision    8. Colon cancer screening    9. Need for prophylactic vaccination against Streptococcus pneumoniae (pneumococcus)              PLAN  Mildly elevated glucose, exercise and  dietary modification advised.  A1c stable    Mild thrombocytopenia , sporadic.  No systemic issues..  Continue to monitor with future labs    Mildly elevated bilirubin.  Other liver enzymes normal.  Fairly stable chronically.  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  Will assess on future labs for any trends or assess for any abdominal symptoms.    Return 1 year or sooner as needed     Orders Placed This Encounter   Procedures    Fecal Immunochemical Test (iFOBT)       HEALTH SCREENINGS  Immunizations:  tdap 2014, can get update at pharmacy  Zoster:   to get at pharmacy  COVID  recommend booster  Prevnar 20 due , given today      Age/Gender Appropriate screenings:  Colonoscopy 2008,  Normal.  No history of polyps.  No family history of colon cancer.  Fit kit negative May 2024, update fit kit    Prostate: psa  above

## 2025-05-06 ENCOUNTER — LAB VISIT (OUTPATIENT)
Dept: LAB | Facility: HOSPITAL | Age: 70
End: 2025-05-06
Attending: FAMILY MEDICINE
Payer: MEDICARE

## 2025-05-06 DIAGNOSIS — Z12.11 COLON CANCER SCREENING: ICD-10-CM

## 2025-05-06 PROCEDURE — 82274 ASSAY TEST FOR BLOOD FECAL: CPT

## 2025-05-07 ENCOUNTER — TELEPHONE (OUTPATIENT)
Dept: CARDIOLOGY | Facility: CLINIC | Age: 70
End: 2025-05-07
Payer: MEDICARE

## 2025-05-07 NOTE — TELEPHONE ENCOUNTER
LVM    Fabiola Shar  Medical Assistant  [facility]  [phone] - Office  [phone] - Fax    ----- Message from Ez Doll NP sent at 5/6/2025  4:42 PM CDT -----  Please inform, Mr. Stanley the heart monitor did not show irregular rhythms. The heart rate was fast at times with some early beats. No additional testing is required. I can start on  a low dose   verapamil of the symptoms persist. Please contact the office with any questions or concerns.    Thank you,  Ez Doll DNP   ----- Message -----  From: Juan Miguel Deras MD  Sent: 4/23/2025   6:19 PM CDT  To: Ez Doll NP

## 2025-05-08 ENCOUNTER — RESULTS FOLLOW-UP (OUTPATIENT)
Dept: FAMILY MEDICINE | Facility: CLINIC | Age: 70
End: 2025-05-08

## 2025-05-08 LAB — OB PNL STL IA: NEGATIVE

## 2025-07-02 ENCOUNTER — OFFICE VISIT (OUTPATIENT)
Dept: CARDIOLOGY | Facility: CLINIC | Age: 70
End: 2025-07-02
Payer: MEDICARE

## 2025-07-02 VITALS
HEART RATE: 62 BPM | DIASTOLIC BLOOD PRESSURE: 75 MMHG | WEIGHT: 208.56 LBS | HEIGHT: 73 IN | OXYGEN SATURATION: 97 % | SYSTOLIC BLOOD PRESSURE: 114 MMHG | BODY MASS INDEX: 27.64 KG/M2

## 2025-07-02 DIAGNOSIS — R00.2 PALPITATIONS: Primary | ICD-10-CM

## 2025-07-02 DIAGNOSIS — R07.89 OTHER CHEST PAIN: ICD-10-CM

## 2025-07-02 PROCEDURE — 99999 PR PBB SHADOW E&M-EST. PATIENT-LVL III: CPT | Mod: PBBFAC,,,

## 2025-07-02 PROCEDURE — 1160F RVW MEDS BY RX/DR IN RCRD: CPT | Mod: CPTII,S$GLB,,

## 2025-07-02 PROCEDURE — 1159F MED LIST DOCD IN RCRD: CPT | Mod: CPTII,S$GLB,,

## 2025-07-02 PROCEDURE — 1101F PT FALLS ASSESS-DOCD LE1/YR: CPT | Mod: CPTII,S$GLB,,

## 2025-07-02 PROCEDURE — 3008F BODY MASS INDEX DOCD: CPT | Mod: CPTII,S$GLB,,

## 2025-07-02 PROCEDURE — 99214 OFFICE O/P EST MOD 30 MIN: CPT | Mod: S$GLB,,,

## 2025-07-02 PROCEDURE — 3078F DIAST BP <80 MM HG: CPT | Mod: CPTII,S$GLB,,

## 2025-07-02 PROCEDURE — 1126F AMNT PAIN NOTED NONE PRSNT: CPT | Mod: CPTII,S$GLB,,

## 2025-07-02 PROCEDURE — 3288F FALL RISK ASSESSMENT DOCD: CPT | Mod: CPTII,S$GLB,,

## 2025-07-02 PROCEDURE — 3074F SYST BP LT 130 MM HG: CPT | Mod: CPTII,S$GLB,,

## 2025-07-02 PROCEDURE — 3044F HG A1C LEVEL LT 7.0%: CPT | Mod: CPTII,S$GLB,,

## 2025-07-02 NOTE — ASSESSMENT & PLAN NOTE
-associated with chest discomfort and cough   -in office EKG reviewed Sinus Bradycardia 58   -48 HR Holter 4/15/25  Interpretation Summary    Predominant Rhythm Sinus rhythm with heart rates varying between 42 and 106 BPM with an average of 59BPM.    There were occasional PVCs.  0.3 % burden    There was 1 run EAT and lasting for 7 beats. The rate varied between 125 and 141 bpm.    There were very frequent PACs    Exercise Cardiac echo stress test  4/17/25  Summary    Stress Protocol: The patient exercised for 11 minutes  seconds on a Edd protocol, achieving a peak heart rate of 146 bpm, which is 95% of the age predicted maximum heart rate. The patient reported shortness of breath during the stress test. The test was stopped because the patient experienced shortness of breath.    Left Ventricle: There is normal systolic function.    Right Ventricle: Systolic function is normal.    Baseline ECG: The Baseline ECG reveals sinus bradycardia and PACs.    Stress ECG: There is no ST segment deviation identified during the protocol. During stress, occasional PVCs are noted. There is normal blood pressure response with stress.    Post-stress Echo: The left ventricle systolic function is hyperdynamic. Right ventricle systolic function is normal.    Post-stress Conclusion: The study is normal and negative with no echocardiographic evidence of stress induced ischemia.

## 2025-07-02 NOTE — ASSESSMENT & PLAN NOTE
Exercise Cardiac echo stress test  4/17/25  Summary    Stress Protocol: The patient exercised for 11 minutes  seconds on a Edd protocol, achieving a peak heart rate of 146 bpm, which is 95% of the age predicted maximum heart rate. The patient reported shortness of breath during the stress test. The test was stopped because the patient experienced shortness of breath.    Left Ventricle: There is normal systolic function.    Right Ventricle: Systolic function is normal.    Baseline ECG: The Baseline ECG reveals sinus bradycardia and PACs.    Stress ECG: There is no ST segment deviation identified during the protocol. During stress, occasional PVCs are noted. There is normal blood pressure response with stress.    Post-stress Echo: The left ventricle systolic function is hyperdynamic. Right ventricle systolic function is normal.    Post-stress Conclusion: The study is normal and negative with no echocardiographic evidence of stress induced ischemia.

## 2025-07-02 NOTE — PROGRESS NOTES
"Subjective:    Patient ID:  Rey Stanley is a 69 y.o. male who presents for evaluation of No chief complaint on file.      PCP: Mark Anthony Chen MD     Referring Provider: Mark Anthony Chen MD     HPI: Patient is a 68 yo M w/PMH of DDD, basel cell carcinoma who presents today for f/u appt. Patient was last seen on 4/1/25 to establish care w c/o palpitations and chest discomfort. 48 HR Holter SR w 1 run of EAT w -141, and frequent PACs. Exercse echo stress test was negative for ischemia w normal systolic function.     Patient reports palpations have decreased. Patient denies CP, SOB, orthopnea, PND, presyncope, LOC, swelling, or claudication.   Patient is not current on prescribed medication. Patient does not exercise regularly, but remains active.    4/1/25: Patient presents today to establish carte and evaluation of irregular heart beat.  Patient reports episodes of palpitations starting 2 weeks ago. Patient reports palpitation awoken him from his sleep. He also reports intermittent chest discomfort described as a light pressure " someone poking their  finger in his chest. He also reports cough associated w palpitations.   Patient denies orthopnea, PND, presyncope, LOC, swelling, or claudication.   Patient is not current on prescribed medication. Patient does not exercise regularly, but remains active.    Mother MI age 70s, Stepfather MI age late 60s. Maternal grandfather cardiac disease.     Past Medical History:   Diagnosis Date    Basal cell carcinoma     left ear    DDD (degenerative disc disease), lumbar     IFG (impaired fasting glucose)     Squamous cell carcinoma of head and neck      Past Surgical History:   Procedure Laterality Date    CHOLECYSTECTOMY      SKIN LESION EXCISION       Social History[1]  Family History   Problem Relation Name Age of Onset    Heart disease Mother          mid 60s unknown if <65    Diabetes Mother      Diabetes Father      Cancer Sister          breast    Cancer " "Sister          breast    Cancer Sister          breast    Thyroid cancer Sister      Kidney cancer Brother      Coronary artery disease Paternal Grandfather  74        CABG    Heart disease Paternal Uncle          late 60s    Prostate cancer Neg Hx      Colon cancer Neg Hx         Review of patient's allergies indicates:   Allergen Reactions    No known drug allergies              Review of Systems   Constitutional: Negative for diaphoresis and fever.   HENT:  Negative for congestion and hearing loss.    Eyes:  Negative for blurred vision and pain.   Cardiovascular:  Positive for chest pain and palpitations. Negative for claudication, dyspnea on exertion, leg swelling, near-syncope and syncope.   Respiratory:  Positive for cough and snoring. Negative for shortness of breath and sleep disturbances due to breathing.    Hematologic/Lymphatic: Negative for bleeding problem. Does not bruise/bleed easily.   Skin:  Negative for color change and poor wound healing.   Gastrointestinal:  Negative for abdominal pain and nausea.   Genitourinary:  Negative for bladder incontinence and flank pain.   Neurological:  Negative for focal weakness and light-headedness.        Objective:   /75 (BP Location: Left arm, Patient Position: Sitting)   Pulse 62   Ht 6' 1" (1.854 m)   Wt 94.6 kg (208 lb 8.9 oz)   SpO2 97%   BMI 27.52 kg/m²    Physical Exam  Constitutional:       Appearance: He is well-developed. He is not diaphoretic.   HENT:      Head: Normocephalic and atraumatic.   Eyes:      General: No scleral icterus.     Pupils: Pupils are equal, round, and reactive to light.   Neck:      Vascular: No JVD.   Cardiovascular:      Rate and Rhythm: Normal rate and regular rhythm.      Pulses: Intact distal pulses.           Radial pulses are 2+ on the right side and 2+ on the left side.        Dorsalis pedis pulses are 2+ on the right side and 2+ on the left side.        Posterior tibial pulses are 2+ on the right side and 2+ on " the left side.      Heart sounds: S1 normal and S2 normal. No murmur heard.     No friction rub. No gallop.   Pulmonary:      Effort: Pulmonary effort is normal. No respiratory distress.      Breath sounds: Normal breath sounds. No wheezing or rales.   Chest:      Chest wall: No tenderness.   Abdominal:      General: Bowel sounds are normal. There is no distension.      Palpations: Abdomen is soft. There is no mass.      Tenderness: There is no abdominal tenderness. There is no rebound.   Musculoskeletal:         General: No tenderness. Normal range of motion.      Cervical back: Normal range of motion and neck supple.   Skin:     General: Skin is warm and dry.      Coloration: Skin is not pale.   Neurological:      Mental Status: He is alert and oriented to person, place, and time.      Coordination: Coordination normal.      Deep Tendon Reflexes: Reflexes normal.   Psychiatric:         Behavior: Behavior normal.         Judgment: Judgment normal.     Exercise Cardiac echo stress test  4/17/25  Summary    Stress Protocol: The patient exercised for 11 minutes  seconds on a Edd protocol, achieving a peak heart rate of 146 bpm, which is 95% of the age predicted maximum heart rate. The patient reported shortness of breath during the stress test. The test was stopped because the patient experienced shortness of breath.    Left Ventricle: There is normal systolic function.    Right Ventricle: Systolic function is normal.    Baseline ECG: The Baseline ECG reveals sinus bradycardia and PACs.    Stress ECG: There is no ST segment deviation identified during the protocol. During stress, occasional PVCs are noted. There is normal blood pressure response with stress.    Post-stress Echo: The left ventricle systolic function is hyperdynamic. Right ventricle systolic function is normal.    Post-stress Conclusion: The study is normal and negative with no echocardiographic evidence of stress induced ischemia.    48 HR Holter  4/15/25  Interpretation Summary    Predominant Rhythm Sinus rhythm with heart rates varying between 42 and 106 BPM with an average of 59BPM.    There were occasional PVCs.  0.3 % burden    There was 1 run EAT and lasting for 7 beats. The rate varied between 125 and 141 bpm.    There were very frequent PACs    EKG reviewed       Assessment:       1. Palpitations    2. Other chest pain    3. BMI 27.0-27.9,adult           Plan:         Palpitations  -associated with chest discomfort and cough   -in office EKG reviewed Sinus Bradycardia 58   -48 HR Holter 4/15/25  Interpretation Summary    Predominant Rhythm Sinus rhythm with heart rates varying between 42 and 106 BPM with an average of 59BPM.    There were occasional PVCs.  0.3 % burden    There was 1 run EAT and lasting for 7 beats. The rate varied between 125 and 141 bpm.    There were very frequent PACs    Exercise Cardiac echo stress test  4/17/25  Summary    Stress Protocol: The patient exercised for 11 minutes  seconds on a Edd protocol, achieving a peak heart rate of 146 bpm, which is 95% of the age predicted maximum heart rate. The patient reported shortness of breath during the stress test. The test was stopped because the patient experienced shortness of breath.    Left Ventricle: There is normal systolic function.    Right Ventricle: Systolic function is normal.    Baseline ECG: The Baseline ECG reveals sinus bradycardia and PACs.    Stress ECG: There is no ST segment deviation identified during the protocol. During stress, occasional PVCs are noted. There is normal blood pressure response with stress.    Post-stress Echo: The left ventricle systolic function is hyperdynamic. Right ventricle systolic function is normal.    Post-stress Conclusion: The study is normal and negative with no echocardiographic evidence of stress induced ischemia.    Other chest pain  Exercise Cardiac echo stress test  4/17/25  Summary    Stress Protocol: The patient exercised  for 11 minutes  seconds on a Edd protocol, achieving a peak heart rate of 146 bpm, which is 95% of the age predicted maximum heart rate. The patient reported shortness of breath during the stress test. The test was stopped because the patient experienced shortness of breath.    Left Ventricle: There is normal systolic function.    Right Ventricle: Systolic function is normal.    Baseline ECG: The Baseline ECG reveals sinus bradycardia and PACs.    Stress ECG: There is no ST segment deviation identified during the protocol. During stress, occasional PVCs are noted. There is normal blood pressure response with stress.    Post-stress Echo: The left ventricle systolic function is hyperdynamic. Right ventricle systolic function is normal.    Post-stress Conclusion: The study is normal and negative with no echocardiographic evidence of stress induced ischemia.    BMI 27.0-27.9,adult  -encouraged increased exercise and activity         Total duration of face to face visit time 30 minutes.  Total time spent counseling greater than fifty percent of total visit time.  Counseling included discussion regarding imaging findings, diagnosis, possibilities, treatment options, risks and benefits.  The patient had many questions regarding the options and long-term effects      Ez Doll DNP  [facility]                   [1]   Social History  Socioeconomic History    Marital status:      Spouse name: Rita    Number of children: 2   Occupational History    Occupation: management   Tobacco Use    Smoking status: Never     Passive exposure: Never    Smokeless tobacco: Never   Substance and Sexual Activity    Alcohol use: Yes     Comment: social    Drug use: Never    Sexual activity: Yes   Social History Narrative    They live in [place]. His kids are both . He has 2 grandkids, 9 mos and 3. No pets. He likes to play golf and hunt.      Social Drivers of Health     Financial Resource Strain: Patient Declined  (4/19/2024)    Overall Financial Resource Strain (CARDIA)     Difficulty of Paying Living Expenses: Patient declined   Food Insecurity: Patient Declined (4/19/2024)    Hunger Vital Sign     Worried About Running Out of Food in the Last Year: Patient declined     Ran Out of Food in the Last Year: Patient declined   Transportation Needs: Unknown (4/19/2024)    PRAPARE - Transportation     Lack of Transportation (Medical): Patient declined   Physical Activity: Insufficiently Active (4/19/2024)    Exercise Vital Sign     Days of Exercise per Week: 1 day     Minutes of Exercise per Session: 60 min   Stress: No Stress Concern Present (4/19/2024)    [employer] - Occupational Stress Questionnaire     Feeling of Stress : Not at all   Housing Stability: Unknown (4/19/2024)    Housing Stability Vital Sign     Unable to Pay for Housing in the Last Year: Patient declined